# Patient Record
Sex: MALE | Race: WHITE | Employment: OTHER | URBAN - METROPOLITAN AREA
[De-identification: names, ages, dates, MRNs, and addresses within clinical notes are randomized per-mention and may not be internally consistent; named-entity substitution may affect disease eponyms.]

---

## 2017-03-27 ENCOUNTER — HOSPITAL ENCOUNTER (EMERGENCY)
Age: 77
Discharge: HOME | End: 2017-03-27
Attending: EMERGENCY MEDICINE
Payer: MEDICARE

## 2017-03-27 ENCOUNTER — APPOINTMENT (OUTPATIENT)
Dept: CT IMAGING | Age: 77
End: 2017-03-27
Attending: EMERGENCY MEDICINE
Payer: MEDICARE

## 2017-03-27 VITALS
HEIGHT: 68 IN | BODY MASS INDEX: 26.26 KG/M2 | SYSTOLIC BLOOD PRESSURE: 134 MMHG | RESPIRATION RATE: 16 BRPM | OXYGEN SATURATION: 96 % | DIASTOLIC BLOOD PRESSURE: 74 MMHG | TEMPERATURE: 98.5 F | WEIGHT: 173.25 LBS | HEART RATE: 87 BPM

## 2017-03-27 DIAGNOSIS — R10.84 ABDOMINAL PAIN, GENERALIZED: Primary | ICD-10-CM

## 2017-03-27 DIAGNOSIS — G89.18 POST-OP PAIN: ICD-10-CM

## 2017-03-27 LAB
ALBUMIN SERPL BCP-MCNC: 3.4 G/DL (ref 3.5–5)
ALBUMIN/GLOB SERPL: 0.8 {RATIO} (ref 1.1–2.2)
ALP SERPL-CCNC: 142 U/L (ref 45–117)
ALT SERPL-CCNC: 65 U/L (ref 12–78)
ANION GAP BLD CALC-SCNC: 5 MMOL/L (ref 5–15)
AST SERPL W P-5'-P-CCNC: 27 U/L (ref 15–37)
BASOPHILS # BLD AUTO: 0 K/UL (ref 0–0.1)
BASOPHILS # BLD: 0 % (ref 0–1)
BILIRUB SERPL-MCNC: 1.2 MG/DL (ref 0.2–1)
BUN SERPL-MCNC: 18 MG/DL (ref 6–20)
BUN/CREAT SERPL: 12 (ref 12–20)
CALCIUM SERPL-MCNC: 10.8 MG/DL (ref 8.5–10.1)
CHLORIDE SERPL-SCNC: 91 MMOL/L (ref 97–108)
CO2 SERPL-SCNC: 35 MMOL/L (ref 21–32)
CREAT SERPL-MCNC: 1.51 MG/DL (ref 0.7–1.3)
EOSINOPHIL # BLD: 0.1 K/UL (ref 0–0.4)
EOSINOPHIL NFR BLD: 1 % (ref 0–7)
ERYTHROCYTE [DISTWIDTH] IN BLOOD BY AUTOMATED COUNT: 12.7 % (ref 11.5–14.5)
GLOBULIN SER CALC-MCNC: 4.4 G/DL (ref 2–4)
GLUCOSE SERPL-MCNC: 113 MG/DL (ref 65–100)
HCT VFR BLD AUTO: 41.6 % (ref 36.6–50.3)
HGB BLD-MCNC: 14.7 G/DL (ref 12.1–17)
LACTATE BLD-SCNC: 0.8 MMOL/L (ref 0.4–2)
LIPASE SERPL-CCNC: 96 U/L (ref 73–393)
LYMPHOCYTES # BLD AUTO: 15 % (ref 12–49)
LYMPHOCYTES # BLD: 1.8 K/UL (ref 0.8–3.5)
MCH RBC QN AUTO: 31.5 PG (ref 26–34)
MCHC RBC AUTO-ENTMCNC: 35.3 G/DL (ref 30–36.5)
MCV RBC AUTO: 89.3 FL (ref 80–99)
MONOCYTES # BLD: 1.7 K/UL (ref 0–1)
MONOCYTES NFR BLD AUTO: 15 % (ref 5–13)
NEUTS SEG # BLD: 8.2 K/UL (ref 1.8–8)
NEUTS SEG NFR BLD AUTO: 69 % (ref 32–75)
PLATELET # BLD AUTO: 338 K/UL (ref 150–400)
POTASSIUM SERPL-SCNC: 4.1 MMOL/L (ref 3.5–5.1)
PROT SERPL-MCNC: 7.8 G/DL (ref 6.4–8.2)
RBC # BLD AUTO: 4.66 M/UL (ref 4.1–5.7)
SODIUM SERPL-SCNC: 131 MMOL/L (ref 136–145)
WBC # BLD AUTO: 11.9 K/UL (ref 4.1–11.1)

## 2017-03-27 PROCEDURE — 99283 EMERGENCY DEPT VISIT LOW MDM: CPT

## 2017-03-27 PROCEDURE — 96375 TX/PRO/DX INJ NEW DRUG ADDON: CPT

## 2017-03-27 PROCEDURE — 80053 COMPREHEN METABOLIC PANEL: CPT | Performed by: EMERGENCY MEDICINE

## 2017-03-27 PROCEDURE — 83605 ASSAY OF LACTIC ACID: CPT

## 2017-03-27 PROCEDURE — 85025 COMPLETE CBC W/AUTO DIFF WBC: CPT | Performed by: EMERGENCY MEDICINE

## 2017-03-27 PROCEDURE — 96361 HYDRATE IV INFUSION ADD-ON: CPT

## 2017-03-27 PROCEDURE — 83690 ASSAY OF LIPASE: CPT | Performed by: EMERGENCY MEDICINE

## 2017-03-27 PROCEDURE — 96374 THER/PROPH/DIAG INJ IV PUSH: CPT

## 2017-03-27 PROCEDURE — 74176 CT ABD & PELVIS W/O CONTRAST: CPT

## 2017-03-27 PROCEDURE — 74011250636 HC RX REV CODE- 250/636: Performed by: EMERGENCY MEDICINE

## 2017-03-27 PROCEDURE — 36415 COLL VENOUS BLD VENIPUNCTURE: CPT | Performed by: EMERGENCY MEDICINE

## 2017-03-27 RX ORDER — ONDANSETRON 2 MG/ML
4 INJECTION INTRAMUSCULAR; INTRAVENOUS
Status: COMPLETED | OUTPATIENT
Start: 2017-03-27 | End: 2017-03-27

## 2017-03-27 RX ORDER — OXYCODONE AND ACETAMINOPHEN 5; 325 MG/1; MG/1
1 TABLET ORAL
COMMUNITY
End: 2018-10-08

## 2017-03-27 RX ORDER — LISINOPRIL 40 MG/1
40 TABLET ORAL
COMMUNITY

## 2017-03-27 RX ORDER — MORPHINE SULFATE 4 MG/ML
4 INJECTION, SOLUTION INTRAMUSCULAR; INTRAVENOUS
Status: COMPLETED | OUTPATIENT
Start: 2017-03-27 | End: 2017-03-27

## 2017-03-27 RX ORDER — ONDANSETRON 4 MG/1
4 TABLET, ORALLY DISINTEGRATING ORAL
Qty: 8 TAB | Refills: 0 | Status: SHIPPED | OUTPATIENT
Start: 2017-03-27 | End: 2018-10-08

## 2017-03-27 RX ORDER — GUAIFENESIN 100 MG/5ML
81 LIQUID (ML) ORAL DAILY
COMMUNITY
End: 2017-11-20

## 2017-03-27 RX ADMIN — ONDANSETRON 4 MG: 2 INJECTION INTRAMUSCULAR; INTRAVENOUS at 06:36

## 2017-03-27 RX ADMIN — SODIUM CHLORIDE 1000 ML: 900 INJECTION, SOLUTION INTRAVENOUS at 06:36

## 2017-03-27 RX ADMIN — Medication 4 MG: at 06:37

## 2017-03-27 NOTE — ED TRIAGE NOTES
I had gallbladder surgery last Thurs. And I am still having a lot of pain and am really nausea. Wife states pt. Has been hallucinating.

## 2017-03-27 NOTE — DISCHARGE INSTRUCTIONS
Abdominal Pain: Care Instructions  Your Care Instructions    Abdominal pain has many possible causes. Some aren't serious and get better on their own in a few days. Others need more testing and treatment. If your pain continues or gets worse, you need to be rechecked and may need more tests to find out what is wrong. You may need surgery to correct the problem. Don't ignore new symptoms, such as fever, nausea and vomiting, urination problems, pain that gets worse, and dizziness. These may be signs of a more serious problem. Your doctor may have recommended a follow-up visit in the next 8 to 12 hours. If you are not getting better, you may need more tests or treatment. The doctor has checked you carefully, but problems can develop later. If you notice any problems or new symptoms, get medical treatment right away. Follow-up care is a key part of your treatment and safety. Be sure to make and go to all appointments, and call your doctor if you are having problems. It's also a good idea to know your test results and keep a list of the medicines you take. How can you care for yourself at home? · Rest until you feel better. · To prevent dehydration, drink plenty of fluids, enough so that your urine is light yellow or clear like water. Choose water and other caffeine-free clear liquids until you feel better. If you have kidney, heart, or liver disease and have to limit fluids, talk with your doctor before you increase the amount of fluids you drink. · If your stomach is upset, eat mild foods, such as rice, dry toast or crackers, bananas, and applesauce. Try eating several small meals instead of two or three large ones. · Wait until 48 hours after all symptoms have gone away before you have spicy foods, alcohol, and drinks that contain caffeine. · Do not eat foods that are high in fat. · Avoid anti-inflammatory medicines such as aspirin, ibuprofen (Advil, Motrin), and naproxen (Aleve).  These can cause stomach upset. Talk to your doctor if you take daily aspirin for another health problem. When should you call for help? Call 911 anytime you think you may need emergency care. For example, call if:  · You passed out (lost consciousness). · You pass maroon or very bloody stools. · You vomit blood or what looks like coffee grounds. · You have new, severe belly pain. Call your doctor now or seek immediate medical care if:  · Your pain gets worse, especially if it becomes focused in one area of your belly. · You have a new or higher fever. · Your stools are black and look like tar, or they have streaks of blood. · You have unexpected vaginal bleeding. · You have symptoms of a urinary tract infection. These may include:  ¨ Pain when you urinate. ¨ Urinating more often than usual.  ¨ Blood in your urine. · You are dizzy or lightheaded, or you feel like you may faint. Watch closely for changes in your health, and be sure to contact your doctor if:  · You are not getting better after 1 day (24 hours). Where can you learn more? Go to http://[employer].info/. Enter V307 in the search box to learn more about \"Abdominal Pain: Care Instructions. \"  Current as of: May 27, 2016  Content Version: 11.1  © 6299-3070 [employer]. Care instructions adapted under license by [employer] (which disclaims liability or warranty for this information). If you have questions about a medical condition or this instruction, always ask your healthcare professional. [place] [address] any warranty or liability for your use of this information. We hope that we have addressed all of your medical concerns. The examination and treatment you received in the Emergency Department were for an emergent problem and were not intended as complete care. It is important that you follow up with your healthcare provider(s) for ongoing care.  If your symptoms worsen or do not improve as expected, and you are unable to reach your usual health care provider(s), you should return to the Emergency Department. Today's healthcare is undergoing tremendous change, and patient satisfaction surveys are one of the many tools to assess the quality of medical care. You may receive a survey from the [employer] regarding your experience in the Emergency Department. I hope that your experience has been completely positive, particularly the medical care that I provided. As such, please participate in the survey; anything less than excellent does not meet my expectations or intentions. [address] [place] [address] and [employer] participate in nationally recognized quality of care measures. If your blood pressure is greater than 120/80, as reported below, we urge that you seek medical care to address the potential of high blood pressure, commonly known as hypertension. Hypertension can be hereditary or can be caused by certain medical conditions, pain, stress, or \"white coat syndrome. \"       Please make an appointment with your health care provider(s) for follow up of your Emergency Department visit. VITALS:   Patient Vitals for the past 8 hrs:   Temp Pulse Resp SpO2   03/27/17 0422 98.4 °F (36.9 °C) (!) 101 16 96 %          Thank you for allowing us to provide you with medical care today. We realize that you have many choices for your emergency care needs. Please choose us in the future for any continued health care needs.       Evens Wiley MD    [facility]   Office: [phone]            Recent Results (from the past 24 hour(s))   CBC WITH AUTOMATED DIFF    Collection Time: 03/27/17  5:03 AM   Result Value Ref Range    WBC 11.9 (H) 4.1 - 11.1 K/uL    RBC 4.66 4.10 - 5.70 M/uL    HGB 14.7 12.1 - 17.0 g/dL    HCT 41.6 36.6 - 50.3 %    MCV 89.3 80.0 - 99.0 FL    MCH 31.5 26.0 - 34.0 PG    MCHC 35.3 30.0 - 36.5 g/dL    RDW 12.7 11.5 - 14.5 %    PLATELET 597 567 - 134 K/uL    NEUTROPHILS 69 32 - 75 %    LYMPHOCYTES 15 12 - 49 %    MONOCYTES 15 (H) 5 - 13 %    EOSINOPHILS 1 0 - 7 %    BASOPHILS 0 0 - 1 %    ABS. NEUTROPHILS 8.2 (H) 1.8 - 8.0 K/UL    ABS. LYMPHOCYTES 1.8 0.8 - 3.5 K/UL    ABS. MONOCYTES 1.7 (H) 0.0 - 1.0 K/UL    ABS. EOSINOPHILS 0.1 0.0 - 0.4 K/UL    ABS. BASOPHILS 0.0 0.0 - 0.1 K/UL   METABOLIC PANEL, COMPREHENSIVE    Collection Time: 03/27/17  5:03 AM   Result Value Ref Range    Sodium 131 (L) 136 - 145 mmol/L    Potassium 4.1 3.5 - 5.1 mmol/L    Chloride 91 (L) 97 - 108 mmol/L    CO2 35 (H) 21 - 32 mmol/L    Anion gap 5 5 - 15 mmol/L    Glucose 113 (H) 65 - 100 mg/dL    BUN 18 6 - 20 MG/DL    Creatinine 1.51 (H) 0.70 - 1.30 MG/DL    BUN/Creatinine ratio 12 12 - 20      GFR est AA 54 (L) >60 ml/min/1.73m2    GFR est non-AA 45 (L) >60 ml/min/1.73m2    Calcium 10.8 (H) 8.5 - 10.1 MG/DL    Bilirubin, total 1.2 (H) 0.2 - 1.0 MG/DL    ALT (SGPT) 65 12 - 78 U/L    AST (SGOT) 27 15 - 37 U/L    Alk. phosphatase 142 (H) 45 - 117 U/L    Protein, total 7.8 6.4 - 8.2 g/dL    Albumin 3.4 (L) 3.5 - 5.0 g/dL    Globulin 4.4 (H) 2.0 - 4.0 g/dL    A-G Ratio 0.8 (L) 1.1 - 2.2     LIPASE    Collection Time: 03/27/17  5:03 AM   Result Value Ref Range    Lipase 96 73 - 393 U/L   POC LACTIC ACID    Collection Time: 03/27/17  5:41 AM   Result Value Ref Range    Lactic Acid (POC) 0.8 0.4 - 2.0 mmol/L       Ct Abd Pelv Wo Cont    Result Date: 3/27/2017  EXAM:  CT ABD PELV WO CONT INDICATION: Right-sided abdominal pain and nausea. Cholecystectomy on 3/23/2017 COMPARISON: None. TECHNIQUE: Helical CT of the abdomen  and pelvis  without contrast. Coronal and sagittal reformats are performed. CT dose reduction was achieved through use of a standardized protocol tailored for this examination and automatic exposure control for dose modulation. Adaptive statistical iterative reconstruction (ASIR) was utilized.  FINDINGS: Solid organ evaluation is limited without contrast. The visualized lung bases demonstrate a trace right pleural effusion and right basilar atelectasis. The heart size is normal. There is no pericardial effusion. There is no renal, ureteral, or bladder calculus. The kidneys are symmetric without hydronephrosis. There is no perinephric fluid or fat stranding. Surgical changes are seen following cholecystectomy with no significant inflammation or collection in the gallbladder fossa. There is no biliary duct dilatation. There are multiple round radiodensities in the common bile duct suggestive of stones (series 601B, image 50). The liver, spleen, pancreas, and adrenal glands are normal.  There are no dilated bowel loops. The appendix is normal.  Sigmoid diverticulosis without focal adjacent inflammation. There are no enlarged lymph nodes. There is no free fluid or free air. There is focal dilatation of the distal aorta measuring 2.6 x 2.8 cm with aortoiliac atherosclerosis. There is no pelvic mass. There are degenerative changes in the lower lumbar spine. There is no aggressive blastic or lytic bony lesion. IMPRESSION: 1. Surgical changes are seen following cholecystectomy without significant inflammation or collection in the gallbladder fossa. Multiple stones are identified in the common bile duct. There is no biliary duct dilatation. 2. Trace right pleural effusion and right basilar atelectasis. 3. Focal dilatation of the distal aorta measuring up to 2.8 cm.

## 2017-03-27 NOTE — ED PROVIDER NOTES
HPI Comments: 67yo M with pmh sig for htn who had laparoscopic cholecystectomy on 3/23 at [facility].  Pt was discharged home Friday. Pt no complains of intermittent abd pain and nuasea. No vomiting. Difficulty sleeping last night. No known fever. Pt taking percocet with some relief of symptoms. Having normal BM using stool softener. Pt not on blood thinners. Pt called his surgeon who was at [facility] but patient and his wife did not want to go to [facility]. Patient is a 76 y.o. male presenting with abdominal pain. The history is provided by the patient and the spouse. Abdominal Pain    Pertinent negatives include no fever, no dysuria, no headaches and no chest pain. Past Medical History:   Diagnosis Date    GERD (gastroesophageal reflux disease)     Hypertension        Past Surgical History:   Procedure Laterality Date    HX CHOLECYSTECTOMY      HX COLONOSCOPY  5/2013    with polyps         History reviewed. No pertinent family history. Social History     Social History    Marital status:      Spouse name: N/A    Number of children: N/A    Years of education: N/A     Occupational History    Not on file. Social History Main Topics    Smoking status: Former Smoker     Packs/day: 0.25     Years: 11.00     Quit date: 1/1/1966    Smokeless tobacco: Not on file    Alcohol use Yes      Comment: occasional    Drug use: Not on file    Sexual activity: Not on file     Other Topics Concern    Not on file     Social History Narrative         ALLERGIES: Review of patient's allergies indicates no known allergies. Review of Systems   Constitutional: Negative for diaphoresis and fever. HENT: Negative for facial swelling. Eyes: Negative for visual disturbance. Respiratory: Negative for cough. Cardiovascular: Negative for chest pain. Gastrointestinal: Positive for abdominal pain. Genitourinary: Negative for dysuria. Musculoskeletal: Negative for joint swelling. Skin: Negative for rash. Neurological: Negative for headaches. Hematological: Negative for adenopathy. Psychiatric/Behavioral: Negative for suicidal ideas. Vitals:    03/27/17 0422   Pulse: (!) 101   Resp: 16   Temp: 98.4 °F (36.9 °C)   SpO2: 96%   Weight: 78.6 kg (173 lb 4 oz)   Height: 5' 7.5\" (1.715 m)            Physical Exam   Constitutional: He is oriented to person, place, and time. He appears well-developed and well-nourished. No distress. HENT:   Head: Normocephalic and atraumatic. Mouth/Throat: Oropharynx is clear and moist.   Eyes: Pupils are equal, round, and reactive to light. Neck: Normal range of motion. Neck supple. Cardiovascular: Normal rate, regular rhythm, normal heart sounds and intact distal pulses. Pulmonary/Chest: Effort normal and breath sounds normal. No respiratory distress. Abdominal: Soft. Bowel sounds are normal. He exhibits no distension. There is no tenderness. Healing laparoscopic abd incisions with mild ecchymosis. No focal tenderness or drainage. Musculoskeletal: Normal range of motion. He exhibits no edema. Neurological: He is alert and oriented to person, place, and time. Skin: Skin is warm and dry. Nursing note and vitals reviewed. MDM  Number of Diagnoses or Management Options  Diagnosis management comments: A:  abd pain and nuasea following laparoscopic cholecystectomy. VS stable. Exam unremarkable. P:  Labs  ivf  Morphine/zofran  Ct a/p    ED Course       Procedures    WBC=11.9  Na and Cl slightly low. BUN/Cr=18/1.51  Lipase normal    CT scan shows stones in CBD but no dilatation. 6:56 AM  Patient resting comfortably with no complaints at this time. VS remain stable. Repeat physical exam is unremarkable. Pt ambulatory without difficulty and tolerating po's well. No evidence of obstruction. Pt stable for discharge to f/u with his surgeon.

## 2017-03-29 NOTE — CALL BACK NOTE
[facility] Emergency Department Follow Up Call Record    Discharged to : Home/Family Home/Home Health/Skilled Facility/Rehab/Assisted Living/Other__home_____  1) Did you receive your discharge instructions? Yes        2) Do you understand them? Yes    This writer spoke with Vinay Resendez who reports he \"thinks he is doing better today\" with eating and activities. Less abdominal pain. He reports having felt sick last few days. He has not had to use the zofran today. Son has offered to take hs father, Mr Camelia Craven to [address] [place] [address] for follow up appointment post cholecystectomy. 3) Are you able to follow them? Yes          If NO, what can I clarify for you? Need for follow up. Unclear as to PCP follow up. Will send list of [facility] [employer]. 4) Do you understand your diagnosis? Yes         5) Do you know which symptoms should prompt you to call the doctor? Yes     6) Were you able to fill and  any medications that were prescribed? Yes     7) You were prescribed __zofran_________for __nausea__________________. Common side effects of this medication are___rash_________________. This is not a complete list so please review the forms given from the pharmacy for a complete list.      8) Are there any questions about your medications? No            Have you scheduled any recommended doctors appointments (specialty, PCP) Mr. Vinay Resendez reports he may be following up (post surgical follow up) with surgeon. If NO, what barriers are you encountering (transportation/lost contact info/cost/  didnt think necessary/no PCP  9) If discharged with Home Health, has the agency contacted you to schedule visit? Not applicable  10) Is there anyone available to help you at home (meals, errands, transportation    monitoring) (adult children, neighbors, private duty companions) Yes SON   11) Are you on a special diet? No         If YES, do you understand the requirements for this diet? Education provided?   12) If presented with cough, bronchitis, COPD, asthma, is it ok to ask that the   respiratory disease management educator call you? Not applicable      13)  A) If presented with fall, were you issued an assistive device in the ED    Are you using? Not applicable  B) If given RX for device, have you obtained? Not applicable       If NO, barriers? C) Therapist recommended: NO   Are you able to implement the suggestions? Not applicable        If NO, barriers to implementation? D) Are you having any difficulties with mobility inside your home?     (steps, bed, tub)No   If YES, ask if the [facility] PT can contact patient and good time and number?  14)  At the end of your discharge instructions, there is information about accessing [facility], have you had a chance to review those? No         Do you have any questions about signing up for this service? We encourage our patients to be active participants in their healthcare and this site is one of the ways to do that. It will allow you to access parts of your medical record, email your doctors office, schedule appointments, and request medications refills . 15) Are there any other questions that I can answer for you regarding    your Emergency department visit?  NO             Estimated Call Time:_____12:55 PM  ______________ Date/Time:_______________

## 2017-11-20 ENCOUNTER — ANESTHESIA (OUTPATIENT)
Dept: ENDOSCOPY | Age: 77
End: 2017-11-20
Payer: MEDICARE

## 2017-11-20 ENCOUNTER — HOSPITAL ENCOUNTER (OUTPATIENT)
Age: 77
Setting detail: OUTPATIENT SURGERY
Discharge: HOME | End: 2017-11-20
Attending: SPECIALIST | Admitting: SPECIALIST
Payer: MEDICARE

## 2017-11-20 ENCOUNTER — ANESTHESIA EVENT (OUTPATIENT)
Dept: ENDOSCOPY | Age: 77
End: 2017-11-20
Payer: MEDICARE

## 2017-11-20 VITALS
BODY MASS INDEX: 26.22 KG/M2 | RESPIRATION RATE: 22 BRPM | SYSTOLIC BLOOD PRESSURE: 125 MMHG | OXYGEN SATURATION: 99 % | TEMPERATURE: 97.8 F | WEIGHT: 173 LBS | DIASTOLIC BLOOD PRESSURE: 88 MMHG | HEART RATE: 100 BPM | HEIGHT: 68 IN

## 2017-11-20 PROCEDURE — 77030003657 HC NDL SCLER BSC -B: Performed by: SPECIALIST

## 2017-11-20 PROCEDURE — 77030009426 HC FCPS BIOP ENDOSC BSC -B: Performed by: SPECIALIST

## 2017-11-20 PROCEDURE — 77030011640 HC PAD GRND REM COVD -A: Performed by: SPECIALIST

## 2017-11-20 PROCEDURE — 77030027957 HC TBNG IRR ENDOGTR BUSS -B: Performed by: SPECIALIST

## 2017-11-20 PROCEDURE — 74011250637 HC RX REV CODE- 250/637: Performed by: SPECIALIST

## 2017-11-20 PROCEDURE — 77030013992 HC SNR POLYP ENDOSC BSC -B: Performed by: SPECIALIST

## 2017-11-20 PROCEDURE — 88305 TISSUE EXAM BY PATHOLOGIST: CPT | Performed by: SPECIALIST

## 2017-11-20 PROCEDURE — 74011000250 HC RX REV CODE- 250

## 2017-11-20 PROCEDURE — 76040000007: Performed by: SPECIALIST

## 2017-11-20 PROCEDURE — 76060000032 HC ANESTHESIA 0.5 TO 1 HR: Performed by: SPECIALIST

## 2017-11-20 PROCEDURE — 74011250636 HC RX REV CODE- 250/636

## 2017-11-20 RX ORDER — EPINEPHRINE 0.1 MG/ML
1 INJECTION INTRACARDIAC; INTRAVENOUS
Status: DISCONTINUED | OUTPATIENT
Start: 2017-11-20 | End: 2017-11-20 | Stop reason: HOSPADM

## 2017-11-20 RX ORDER — PROPOFOL 10 MG/ML
INJECTION, EMULSION INTRAVENOUS AS NEEDED
Status: DISCONTINUED | OUTPATIENT
Start: 2017-11-20 | End: 2017-11-20 | Stop reason: HOSPADM

## 2017-11-20 RX ORDER — NALOXONE HYDROCHLORIDE 0.4 MG/ML
0.4 INJECTION, SOLUTION INTRAMUSCULAR; INTRAVENOUS; SUBCUTANEOUS
Status: DISCONTINUED | OUTPATIENT
Start: 2017-11-20 | End: 2017-11-20 | Stop reason: HOSPADM

## 2017-11-20 RX ORDER — LIDOCAINE HYDROCHLORIDE 20 MG/ML
INJECTION, SOLUTION EPIDURAL; INFILTRATION; INTRACAUDAL; PERINEURAL AS NEEDED
Status: DISCONTINUED | OUTPATIENT
Start: 2017-11-20 | End: 2017-11-20 | Stop reason: HOSPADM

## 2017-11-20 RX ORDER — SODIUM CHLORIDE 9 MG/ML
INJECTION, SOLUTION INTRAVENOUS
Status: DISCONTINUED | OUTPATIENT
Start: 2017-11-20 | End: 2017-11-20 | Stop reason: HOSPADM

## 2017-11-20 RX ORDER — FLUMAZENIL 0.1 MG/ML
0.2 INJECTION INTRAVENOUS
Status: DISCONTINUED | OUTPATIENT
Start: 2017-11-20 | End: 2017-11-20 | Stop reason: HOSPADM

## 2017-11-20 RX ORDER — DEXTROMETHORPHAN/PSEUDOEPHED 2.5-7.5/.8
1.2 DROPS ORAL
Status: DISCONTINUED | OUTPATIENT
Start: 2017-11-20 | End: 2017-11-20 | Stop reason: HOSPADM

## 2017-11-20 RX ORDER — FENTANYL CITRATE 50 UG/ML
200 INJECTION, SOLUTION INTRAMUSCULAR; INTRAVENOUS
Status: DISCONTINUED | OUTPATIENT
Start: 2017-11-20 | End: 2017-11-20 | Stop reason: HOSPADM

## 2017-11-20 RX ORDER — LORAZEPAM 2 MG/ML
2 INJECTION INTRAMUSCULAR AS NEEDED
Status: DISCONTINUED | OUTPATIENT
Start: 2017-11-20 | End: 2017-11-20 | Stop reason: HOSPADM

## 2017-11-20 RX ORDER — SODIUM CHLORIDE 0.9 % (FLUSH) 0.9 %
5-10 SYRINGE (ML) INJECTION EVERY 8 HOURS
Status: DISCONTINUED | OUTPATIENT
Start: 2017-11-20 | End: 2017-11-20 | Stop reason: HOSPADM

## 2017-11-20 RX ORDER — ATROPINE SULFATE 0.1 MG/ML
0.5 INJECTION INTRAVENOUS
Status: DISCONTINUED | OUTPATIENT
Start: 2017-11-20 | End: 2017-11-20 | Stop reason: HOSPADM

## 2017-11-20 RX ORDER — SODIUM CHLORIDE 0.9 % (FLUSH) 0.9 %
5-10 SYRINGE (ML) INJECTION AS NEEDED
Status: DISCONTINUED | OUTPATIENT
Start: 2017-11-20 | End: 2017-11-20 | Stop reason: HOSPADM

## 2017-11-20 RX ORDER — OMEPRAZOLE 10 MG/1
10 CAPSULE, DELAYED RELEASE ORAL
COMMUNITY

## 2017-11-20 RX ORDER — SODIUM CHLORIDE 9 MG/ML
50 INJECTION, SOLUTION INTRAVENOUS CONTINUOUS
Status: DISCONTINUED | OUTPATIENT
Start: 2017-11-20 | End: 2017-11-20 | Stop reason: HOSPADM

## 2017-11-20 RX ORDER — MIDAZOLAM HYDROCHLORIDE 1 MG/ML
.25-1 INJECTION, SOLUTION INTRAMUSCULAR; INTRAVENOUS
Status: DISCONTINUED | OUTPATIENT
Start: 2017-11-20 | End: 2017-11-20 | Stop reason: HOSPADM

## 2017-11-20 RX ADMIN — SODIUM CHLORIDE: 9 INJECTION, SOLUTION INTRAVENOUS at 12:00

## 2017-11-20 RX ADMIN — PROPOFOL 50 MG: 10 INJECTION, EMULSION INTRAVENOUS at 12:24

## 2017-11-20 RX ADMIN — PROPOFOL 50 MG: 10 INJECTION, EMULSION INTRAVENOUS at 12:10

## 2017-11-20 RX ADMIN — LIDOCAINE HYDROCHLORIDE 40 MG: 20 INJECTION, SOLUTION EPIDURAL; INFILTRATION; INTRACAUDAL; PERINEURAL at 12:09

## 2017-11-20 RX ADMIN — PROPOFOL 30 MG: 10 INJECTION, EMULSION INTRAVENOUS at 12:32

## 2017-11-20 RX ADMIN — PROPOFOL 50 MG: 10 INJECTION, EMULSION INTRAVENOUS at 12:17

## 2017-11-20 RX ADMIN — PROPOFOL 50 MG: 10 INJECTION, EMULSION INTRAVENOUS at 12:12

## 2017-11-20 RX ADMIN — PROPOFOL 50 MG: 10 INJECTION, EMULSION INTRAVENOUS at 12:09

## 2017-11-20 NOTE — H&P
Pre-endoscopy H and P for Colonoscopy    The patient was seen and examined. Date of last colonoscopy: 2012, Polyps  No      The airway was assessed and documented. The problem list, past medical history, and medications were reviewed. There is no problem list on file for this patient. Social History     Social History    Marital status:      Spouse name: N/A    Number of children: N/A    Years of education: N/A     Occupational History    Not on file. Social History Main Topics    Smoking status: Former Smoker     Packs/day: 0.25     Years: 11.00     Quit date: 1/1/1966    Smokeless tobacco: Not on file    Alcohol use Yes      Comment: occasional    Drug use: Not on file    Sexual activity: Not on file     Other Topics Concern    Not on file     Social History Narrative     Past Medical History:   Diagnosis Date    GERD (gastroesophageal reflux disease)     Hypertension      The patient has a family history of na    Prior to Admission Medications   Prescriptions Last Dose Informant Patient Reported? Taking?   aspirin 81 mg chewable tablet 11/13/2017 at Unknown time  Yes Yes   Sig: Take 81 mg by mouth daily. lisinopril (PRINIVIL, ZESTRIL) 40 mg tablet 11/19/2017 at Unknown time  Yes Yes   Sig: Take 40 mg by mouth daily. omeprazole (PRILOSEC) 10 mg capsule 11/13/2017 at Unknown time  Yes Yes   Sig: Take 10 mg by mouth daily. Indications: PREVENTION OF NSAID-INDUCED GASTRIC ULCER   ondansetron (ZOFRAN ODT) 4 mg disintegrating tablet   No No   Sig: Take 1 Tab by mouth every eight (8) hours as needed for Nausea. oxyCODONE-acetaminophen (PERCOCET) 5-325 mg per tablet 10/20/2017 at Unknown time  Yes Yes   Sig: Take 1 Tab by mouth every four (4) hours as needed for Pain.       Facility-Administered Medications: None         The review of systems is:  negative for shortness of breath or chest pain      The heart, lungs and mental status were satisfactory for the administration of MAC sedation and for the procedure. Mallampati score: See Anesthesia. I discussed with the patient the objectives, risks, consequences and alternatives to the procedure. Plan: Endoscopic procedure with MAC sedation.     Hemant Cardoza MD  11/20/2017  11:58 AM

## 2017-11-20 NOTE — PROCEDURES
Colonoscopy Procedure Note    Indications:   Personal history of colon polyps (screening only)  Referring Physician: Allan Adame MD   Anesthesia/Sedation:MAC  Endoscopist:  Dr. Addie Keene  Assistant:  Endoscopy Technician-1: Artem Palomino  Endoscopy RN-1: Yung Claire    Preoperative diagnosis: SCREENING COLONOSCOPY    Postoperative diagnosis: 1. Moderate Sigmoid Diverticulosis  2. Cecum Polyp  3. Ascending Colon Polyp      Procedure in Detail:  Informed consent was obtained for the procedure, including sedation. Risks of perforation, hemorrhage, adverse drug reaction, and aspiration were discussed. The patient was placed in the left lateral decubitus position. Based on the pre-procedure assessment, including review of the patient's medical history, medications, allergies, and review of systems, he had been deemed to be an appropriate candidate for moderate sedation; he was therefore sedated with the medications listed above. The patient was monitored continuously with ECG tracing, pulse oximetry, blood pressure monitoring, and direct observations. A rectal examination was performed. The [id number] was inserted into the rectum and advanced under direct vision to the cecum, which was identified by the ileocecal valve and appendiceal orifice. The quality of the colonic preparation was excellent. A careful inspection was made as the colonoscope was withdrawn, including a retroflexed view of the rectum; findings and interventions are described below. Findings:   Rectum: Grade 1 internal hemorrhoid(s); Sigmoid: moderate diverticulosis; Descending Colon: normal  Transverse Colon: normal  Ascending Colon: very proximal 5 mm polyp removed with hot snare after saline pillow,   Cecum: 3 mm polyp removed with cold snare  Terminal Ileum: not intubated    Specimens:     see above    EBL: None    Complications: None; patient tolerated the procedure well.       Recommendations:     - If adenoma is present, repeat colonoscopy in 5 years.      - If < 10 years, reason: above average risk patient    Signed By: Estevan Gamble MD                        November 20, 2017

## 2017-11-20 NOTE — PERIOP NOTES

## 2017-11-20 NOTE — ANESTHESIA PREPROCEDURE EVALUATION
Anesthetic History               Review of Systems / Medical History  Patient summary reviewed, nursing notes reviewed and pertinent labs reviewed    Pulmonary                   Neuro/Psych              Cardiovascular    Hypertension              Exercise tolerance: >4 METS     GI/Hepatic/Renal     GERD          Comments: gastritis Endo/Other             Other Findings            Physical Exam    Airway  Mallampati: II  TM Distance: > 6 cm  Neck ROM: normal range of motion   Mouth opening: Normal     Cardiovascular    Rhythm: regular  Rate: normal         Dental  No notable dental hx       Pulmonary  Breath sounds clear to auscultation               Abdominal         Other Findings            Anesthetic Plan    ASA: 2  Anesthesia type: MAC          Induction: Intravenous  Anesthetic plan and risks discussed with: Patient

## 2017-11-20 NOTE — IP AVS SNAPSHOT
[address] 
[phone] Patient: Jaylen Esposito MRN: [id number] DP About your hospitalization You were admitted on:  2017 You last received care in the:  [facility] ENDOSCOPY You were discharged on:  2017 Why you were hospitalized Your primary diagnosis was:  Not on File Discharge Orders None A check regina indicates which time of day the medication should be taken. My Medications STOP taking these medications   
 aspirin 81 mg chewable tablet TAKE these medications as instructed Instructions Each Dose to Equal  
 Morning Noon Evening Bedtime  
 lisinopril 40 mg tablet Commonly known as:  Rdaha Needy Your last dose was: Your next dose is: Take 40 mg by mouth daily. 40 mg  
    
   
   
   
  
 ondansetron 4 mg disintegrating tablet Commonly known as:  ZOFRAN ODT Your last dose was: Your next dose is: Take 1 Tab by mouth every eight (8) hours as needed for Nausea. 4 mg PERCOCET 5-325 mg per tablet Generic drug:  oxyCODONE-acetaminophen Your last dose was: Your next dose is: Take 1 Tab by mouth every four (4) hours as needed for Pain. 1 Tab PriLOSEC 10 mg capsule Generic drug:  omeprazole Your last dose was: Your next dose is: Take 10 mg by mouth daily. Indications: PREVENTION OF NSAID-INDUCED GASTRIC ULCER  
 10 mg Discharge Instructions Jaylen Esposito 
[id number] 
1940 COLON DISCHARGE INSTRUCTIONS Discomfort: 
Redness at IV site- apply warm compress to area; if redness or soreness persist- contact your physician There may be a slight amount of blood passed from the rectum Gaseous discomfort- walking, belching will help relieve any discomfort You may not operate a vehicle for 12 hours You may not engage in an occupation involving machinery or appliances for rest of today You may not drink alcoholic beverages for at least 12 hours Avoid making any critical decisions for at least 24 hour DIET: 
 Regular diet.  however -  remember your colon is empty and a heavy meal will produce gas. Avoid these foods:  vegetables, fried / greasy foods, carbonated drinks for today. ACTIVITY: 
You may resume your normal daily activities it is recommended that you spend the remainder of the day resting -  avoid any strenuous activity. CALL M.D. ANY SIGN OF: Increasing pain, nausea, vomiting Abdominal distension (swelling) New increased bleeding (oral or rectal) Fever (chills) Pain in chest area Shortness of breath You may not  take any Advil, Aspirin, Ibuprofen, Motrin, Aleve, Goodys, or any similar pain or arthritis products for 10 days, ONLY  Tylenol as needed for pain. Follow-up Instructions: 
 Call Dr. Marilia Malloy Results of procedure / biopsy in 10-14days Office telephone for problems or questions [phone] Recommendation for next colonscopy in 5 years If < 10 years, reason:  above average risk patient Introducing [facility]! Dear Óscar Found: Thank you for requesting a [employer] account. Our records indicate that you already have an active IMANIN account. You can access your account anytime at [email or web address] Did you know that you can access your hospital and ER discharge instructions at any time in [employer]? You can also review all of your test results from your hospital stay or ER visit. Additional Information If you have questions, please visit the Frequently Asked Questions section of the [employer] website at [email or web address] Remember, MyChart is NOT to be used for urgent needs. For medical emergencies, dial 911. Now available from your iPhone and Android! Providers Seen During Your Hospitalization Provider Specialty Primary office phone Valentino Anaya MD Gastroenterology [phone] Your Primary Care Physician (PCP) Primary Care Physician Office Phone Office Fax Quentin Dasilva [phone] You are allergic to the following No active allergies Recent Documentation Height Weight BMI Smoking Status 1.715 m 78.5 kg 26.7 kg/m2 Former Smoker Emergency Contacts Name Discharge Info Relation Home Work Mobile Piper Osman  Spouse [[phone] Patient Belongings The following personal items are in your possession at time of discharge: 
  Dental Appliances: None  Visual Aid: None Please provide this summary of care documentation to your next provider. Signatures-by signing, you are acknowledging that this After Visit Summary has been reviewed with you and you have received a copy. Patient Signature:  ____________________________________________________________ Date:  ____________________________________________________________  
  
[employer] Provider Signature:  ____________________________________________________________ Date:  ____________________________________________________________

## 2017-11-20 NOTE — ANESTHESIA POSTPROCEDURE EVALUATION
Post-Anesthesia Evaluation and Assessment    Patient: Juan Pablo Fuentes MRN: [id number]  SSN: [id number]    YOB: 1940  Age: 76 y.o. Sex: male       Cardiovascular Function/Vital Signs  Visit Vitals    /72    Pulse 100    Temp 36.6 °C (97.8 °F)    Resp 16    Ht 5' 7.5\" (1.715 m)    Wt 78.5 kg (173 lb)    SpO2 100%    BMI 26.7 kg/m2       Patient is status post MAC anesthesia for Procedure(s):  COLONOSCOPY  ENDOSCOPIC POLYPECTOMY  INJECTION. Nausea/Vomiting: None    Postoperative hydration reviewed and adequate. Pain:  Pain Scale 1: Numeric (0 - 10) (11/20/17 1247)  Pain Intensity 1: 0 (11/20/17 1247)   Managed    Neurological Status: At baseline    Mental Status and Level of Consciousness: Arousable    Pulmonary Status:   O2 Device: Room air (11/20/17 1247)   Adequate oxygenation and airway patent    Complications related to anesthesia: None    Post-anesthesia assessment completed.  No concerns    Signed By: Eyal Ortiz MD     November 20, 2017

## 2017-11-20 NOTE — DISCHARGE INSTRUCTIONS
Abel Randall  [id number]  1940    COLON DISCHARGE INSTRUCTIONS  Discomfort:  Redness at IV site- apply warm compress to area; if redness or soreness persist- contact your physician  There may be a slight amount of blood passed from the rectum  Gaseous discomfort- walking, belching will help relieve any discomfort  You may not operate a vehicle for 12 hours  You may not engage in an occupation involving machinery or appliances for rest of today  You may not drink alcoholic beverages for at least 12 hours  Avoid making any critical decisions for at least 24 hour  DIET:   Regular diet. - however -  remember your colon is empty and a heavy meal will produce gas. Avoid these foods:  vegetables, fried / greasy foods, carbonated drinks for today. ACTIVITY:  You may resume your normal daily activities it is recommended that you spend the remainder of the day resting -  avoid any strenuous activity. CALL M.D. ANY SIGN OF:  Increasing pain, nausea, vomiting  Abdominal distension (swelling)  New increased bleeding (oral or rectal)  Fever (chills)  Pain in chest area    Shortness of breath    You may not  take any Advil, Aspirin, Ibuprofen, Motrin, Aleve, Goodys, or any similar pain or arthritis products for 10 days, ONLY  Tylenol as needed for pain.       Follow-up Instructions:   Call Dr. Keyshawn Rogel  Results of procedure / biopsy in 10-14days   Office telephone for problems or questions [phone]    Recommendation for next colonscopy in 5 years  If < 10 years, reason:  above average risk patient

## 2017-11-20 NOTE — ROUTINE PROCESS
William Hash  1940  [id number]    Situation:  Verbal report received from: Loy Zhou RN  Procedure: Procedure(s):  COLONOSCOPY  ENDOSCOPIC POLYPECTOMY  INJECTION    Background:    Preoperative diagnosis: SCREENING COLONOSCOPY  Postoperative diagnosis: 1. Moderate Sigmoid Diverticulosis  2. Cecum Polyp  3. Ascending Colon Polyp    :  Dr. Nicole Harris  Assistant(s): Endoscopy Technician-1: Yuki Mayen  Endoscopy RN-1: Solitario Hanna    Specimens:   ID Type Source Tests Collected by Time Destination   1 : cecum polyp Preservative Cecum  Michael Sanchez MD 11/20/2017 1222 Pathology   2 : Ascending colon Polyp Preservative Colon, Ascending  Michael Snachez MD 11/20/2017 1228 Pathology     H. Pylori  no    Assessment:  Intra-procedure medications   Anesthesia gave intra-procedure sedation and medications, see anesthesia flow sheet yes    Intravenous fluids: NS@ KVO     Vital signs stable     Abdominal assessment: round and soft     Recommendation:  Discharge patient per MD order.     Family or Friend   Permission to share finding with family or friend yes

## 2018-10-08 ENCOUNTER — APPOINTMENT (OUTPATIENT)
Dept: CT IMAGING | Age: 78
DRG: 896 | End: 2018-10-08
Attending: EMERGENCY MEDICINE
Payer: MEDICARE

## 2018-10-08 ENCOUNTER — APPOINTMENT (OUTPATIENT)
Dept: GENERAL RADIOLOGY | Age: 78
DRG: 896 | End: 2018-10-08
Attending: FAMILY MEDICINE
Payer: MEDICARE

## 2018-10-08 ENCOUNTER — HOSPITAL ENCOUNTER (INPATIENT)
Age: 78
LOS: 3 days | Discharge: HOME | DRG: 896 | End: 2018-10-11
Attending: EMERGENCY MEDICINE | Admitting: FAMILY MEDICINE
Payer: MEDICARE

## 2018-10-08 DIAGNOSIS — R56.9 SEIZURE (HCC): ICD-10-CM

## 2018-10-08 DIAGNOSIS — F10.939 ALCOHOL WITHDRAWAL SYNDROME WITH COMPLICATION (HCC): ICD-10-CM

## 2018-10-08 DIAGNOSIS — R10.13 ABDOMINAL PAIN, EPIGASTRIC: Primary | ICD-10-CM

## 2018-10-08 PROBLEM — R79.89 ELEVATED LFTS: Status: ACTIVE | Noted: 2018-10-08

## 2018-10-08 PROBLEM — R41.82 ALTERED MENTAL STATUS: Status: ACTIVE | Noted: 2018-10-08

## 2018-10-08 PROBLEM — I63.9 STROKE (HCC): Status: ACTIVE | Noted: 2018-10-08

## 2018-10-08 LAB
ALBUMIN SERPL-MCNC: 3.7 G/DL (ref 3.5–5)
ALBUMIN/GLOB SERPL: 0.9 {RATIO} (ref 1.1–2.2)
ALP SERPL-CCNC: 301 U/L (ref 45–117)
ALT SERPL-CCNC: 442 U/L (ref 12–78)
AMMONIA PLAS-SCNC: 16 UMOL/L
ANION GAP SERPL CALC-SCNC: 6 MMOL/L (ref 5–15)
APTT PPP: 25.1 SEC (ref 22.1–32)
AST SERPL-CCNC: 770 U/L (ref 15–37)
BASOPHILS # BLD: 0.1 K/UL (ref 0–0.1)
BASOPHILS NFR BLD: 1 % (ref 0–1)
BILIRUB SERPL-MCNC: 1.6 MG/DL (ref 0.2–1)
BUN SERPL-MCNC: 16 MG/DL (ref 6–20)
BUN/CREAT SERPL: 12 (ref 12–20)
CALCIUM SERPL-MCNC: 8.8 MG/DL (ref 8.5–10.1)
CHLORIDE SERPL-SCNC: 104 MMOL/L (ref 97–108)
CO2 SERPL-SCNC: 26 MMOL/L (ref 21–32)
COMMENT, HOLDF: NORMAL
COMMENT, HOLDF: NORMAL
CREAT SERPL-MCNC: 1.32 MG/DL (ref 0.7–1.3)
DIFFERENTIAL METHOD BLD: NORMAL
EOSINOPHIL # BLD: 0.1 K/UL (ref 0–0.4)
EOSINOPHIL NFR BLD: 1 % (ref 0–7)
ERYTHROCYTE [DISTWIDTH] IN BLOOD BY AUTOMATED COUNT: 12.8 % (ref 11.5–14.5)
ETHANOL SERPL-MCNC: <10 MG/DL
GLOBULIN SER CALC-MCNC: 4 G/DL (ref 2–4)
GLUCOSE SERPL-MCNC: 104 MG/DL (ref 65–100)
HCT VFR BLD AUTO: 42.4 % (ref 36.6–50.3)
HGB BLD-MCNC: 14.8 G/DL (ref 12.1–17)
IMM GRANULOCYTES # BLD: 0 K/UL (ref 0–0.04)
IMM GRANULOCYTES NFR BLD AUTO: 0 % (ref 0–0.5)
INR PPP: 1 (ref 0.9–1.1)
LACTATE SERPL-SCNC: 1.3 MMOL/L (ref 0.4–2)
LIPASE SERPL-CCNC: 253 U/L (ref 73–393)
LYMPHOCYTES # BLD: 1.8 K/UL (ref 0.8–3.5)
LYMPHOCYTES NFR BLD: 20 % (ref 12–49)
MCH RBC QN AUTO: 32.5 PG (ref 26–34)
MCHC RBC AUTO-ENTMCNC: 34.9 G/DL (ref 30–36.5)
MCV RBC AUTO: 93 FL (ref 80–99)
MONOCYTES # BLD: 0.7 K/UL (ref 0–1)
MONOCYTES NFR BLD: 8 % (ref 5–13)
NEUTS SEG # BLD: 6.4 K/UL (ref 1.8–8)
NEUTS SEG NFR BLD: 71 % (ref 32–75)
NRBC # BLD: 0 K/UL (ref 0–0.01)
NRBC BLD-RTO: 0 PER 100 WBC
PLATELET # BLD AUTO: 339 K/UL (ref 150–400)
PMV BLD AUTO: 9.3 FL (ref 8.9–12.9)
POTASSIUM SERPL-SCNC: 4 MMOL/L (ref 3.5–5.1)
PROT SERPL-MCNC: 7.7 G/DL (ref 6.4–8.2)
PROTHROMBIN TIME: 9.8 SEC (ref 9–11.1)
RBC # BLD AUTO: 4.56 M/UL (ref 4.1–5.7)
SAMPLES BEING HELD,HOLD: NORMAL
SAMPLES BEING HELD,HOLD: NORMAL
SODIUM SERPL-SCNC: 136 MMOL/L (ref 136–145)
THERAPEUTIC RANGE,PTTT: NORMAL SECS (ref 58–77)
TROPONIN I SERPL-MCNC: <0.05 NG/ML
WBC # BLD AUTO: 9 K/UL (ref 4.1–11.1)

## 2018-10-08 PROCEDURE — 74011000258 HC RX REV CODE- 258: Performed by: FAMILY MEDICINE

## 2018-10-08 PROCEDURE — 74011000250 HC RX REV CODE- 250: Performed by: EMERGENCY MEDICINE

## 2018-10-08 PROCEDURE — 85025 COMPLETE CBC W/AUTO DIFF WBC: CPT | Performed by: EMERGENCY MEDICINE

## 2018-10-08 PROCEDURE — 95816 EEG AWAKE AND DROWSY: CPT | Performed by: FAMILY MEDICINE

## 2018-10-08 PROCEDURE — 74174 CTA ABD&PLVS W/CONTRAST: CPT

## 2018-10-08 PROCEDURE — 74011636320 HC RX REV CODE- 636/320: Performed by: EMERGENCY MEDICINE

## 2018-10-08 PROCEDURE — 83605 ASSAY OF LACTIC ACID: CPT | Performed by: FAMILY MEDICINE

## 2018-10-08 PROCEDURE — 96376 TX/PRO/DX INJ SAME DRUG ADON: CPT

## 2018-10-08 PROCEDURE — 80053 COMPREHEN METABOLIC PANEL: CPT | Performed by: EMERGENCY MEDICINE

## 2018-10-08 PROCEDURE — 80307 DRUG TEST PRSMV CHEM ANLYZR: CPT | Performed by: FAMILY MEDICINE

## 2018-10-08 PROCEDURE — 74011250636 HC RX REV CODE- 250/636

## 2018-10-08 PROCEDURE — 93005 ELECTROCARDIOGRAM TRACING: CPT

## 2018-10-08 PROCEDURE — 74011250636 HC RX REV CODE- 250/636: Performed by: FAMILY MEDICINE

## 2018-10-08 PROCEDURE — 71045 X-RAY EXAM CHEST 1 VIEW: CPT

## 2018-10-08 PROCEDURE — 65610000006 HC RM INTENSIVE CARE

## 2018-10-08 PROCEDURE — 85610 PROTHROMBIN TIME: CPT | Performed by: EMERGENCY MEDICINE

## 2018-10-08 PROCEDURE — 83690 ASSAY OF LIPASE: CPT | Performed by: EMERGENCY MEDICINE

## 2018-10-08 PROCEDURE — 74011000258 HC RX REV CODE- 258: Performed by: EMERGENCY MEDICINE

## 2018-10-08 PROCEDURE — 70450 CT HEAD/BRAIN W/O DYE: CPT

## 2018-10-08 PROCEDURE — 99285 EMERGENCY DEPT VISIT HI MDM: CPT

## 2018-10-08 PROCEDURE — 96375 TX/PRO/DX INJ NEW DRUG ADDON: CPT

## 2018-10-08 PROCEDURE — 36415 COLL VENOUS BLD VENIPUNCTURE: CPT | Performed by: EMERGENCY MEDICINE

## 2018-10-08 PROCEDURE — 82140 ASSAY OF AMMONIA: CPT | Performed by: EMERGENCY MEDICINE

## 2018-10-08 PROCEDURE — 96374 THER/PROPH/DIAG INJ IV PUSH: CPT

## 2018-10-08 PROCEDURE — 96361 HYDRATE IV INFUSION ADD-ON: CPT

## 2018-10-08 PROCEDURE — C9113 INJ PANTOPRAZOLE SODIUM, VIA: HCPCS | Performed by: EMERGENCY MEDICINE

## 2018-10-08 PROCEDURE — 74011250636 HC RX REV CODE- 250/636: Performed by: EMERGENCY MEDICINE

## 2018-10-08 PROCEDURE — 87040 BLOOD CULTURE FOR BACTERIA: CPT | Performed by: FAMILY MEDICINE

## 2018-10-08 PROCEDURE — 85730 THROMBOPLASTIN TIME PARTIAL: CPT | Performed by: EMERGENCY MEDICINE

## 2018-10-08 PROCEDURE — 84484 ASSAY OF TROPONIN QUANT: CPT | Performed by: EMERGENCY MEDICINE

## 2018-10-08 RX ORDER — LORAZEPAM 2 MG/ML
INJECTION INTRAMUSCULAR
Status: COMPLETED
Start: 2018-10-08 | End: 2018-10-08

## 2018-10-08 RX ORDER — LORAZEPAM 2 MG/ML
1 INJECTION INTRAMUSCULAR ONCE
Status: COMPLETED | OUTPATIENT
Start: 2018-10-08 | End: 2018-10-08

## 2018-10-08 RX ORDER — SODIUM CHLORIDE 0.9 % (FLUSH) 0.9 %
5-10 SYRINGE (ML) INJECTION AS NEEDED
Status: DISCONTINUED | OUTPATIENT
Start: 2018-10-08 | End: 2018-10-11 | Stop reason: HOSPADM

## 2018-10-08 RX ORDER — CALCIUM CARBONATE 200(500)MG
1 TABLET,CHEWABLE ORAL AS NEEDED
COMMUNITY

## 2018-10-08 RX ORDER — LORAZEPAM 2 MG/ML
2 INJECTION INTRAMUSCULAR
Status: DISCONTINUED | OUTPATIENT
Start: 2018-10-08 | End: 2018-10-10

## 2018-10-08 RX ORDER — LEVOFLOXACIN 5 MG/ML
750 INJECTION, SOLUTION INTRAVENOUS
Status: DISCONTINUED | OUTPATIENT
Start: 2018-10-08 | End: 2018-10-11

## 2018-10-08 RX ORDER — LORAZEPAM 2 MG/ML
1 INJECTION INTRAMUSCULAR
Status: DISCONTINUED | OUTPATIENT
Start: 2018-10-08 | End: 2018-10-10

## 2018-10-08 RX ORDER — SODIUM CHLORIDE 0.9 % (FLUSH) 0.9 %
10 SYRINGE (ML) INJECTION
Status: COMPLETED | OUTPATIENT
Start: 2018-10-08 | End: 2018-10-08

## 2018-10-08 RX ORDER — LORAZEPAM 2 MG/ML
2 INJECTION INTRAMUSCULAR
Status: COMPLETED | OUTPATIENT
Start: 2018-10-08 | End: 2018-10-08

## 2018-10-08 RX ADMIN — LORAZEPAM 1 MG: 2 INJECTION INTRAMUSCULAR at 19:19

## 2018-10-08 RX ADMIN — SODIUM CHLORIDE 100 ML: 900 INJECTION, SOLUTION INTRAVENOUS at 19:22

## 2018-10-08 RX ADMIN — SODIUM CHLORIDE 241 ML: 900 INJECTION, SOLUTION INTRAVENOUS at 22:42

## 2018-10-08 RX ADMIN — Medication 10 ML: at 19:22

## 2018-10-08 RX ADMIN — PIPERACILLIN SODIUM,TAZOBACTAM SODIUM 3.38 G: 3; .375 INJECTION, POWDER, FOR SOLUTION INTRAVENOUS at 22:43

## 2018-10-08 RX ADMIN — SODIUM CHLORIDE 1000 ML: 900 INJECTION, SOLUTION INTRAVENOUS at 22:42

## 2018-10-08 RX ADMIN — LORAZEPAM 2 MG: 2 INJECTION INTRAMUSCULAR; INTRAVENOUS at 19:45

## 2018-10-08 RX ADMIN — SODIUM CHLORIDE 1000 ML: 900 INJECTION, SOLUTION INTRAVENOUS at 19:08

## 2018-10-08 RX ADMIN — IOPAMIDOL 100 ML: 755 INJECTION, SOLUTION INTRAVENOUS at 19:22

## 2018-10-08 RX ADMIN — LORAZEPAM 2 MG: 2 INJECTION INTRAMUSCULAR at 19:45

## 2018-10-08 RX ADMIN — SODIUM CHLORIDE 40 MG: 9 INJECTION INTRAMUSCULAR; INTRAVENOUS; SUBCUTANEOUS at 21:31

## 2018-10-08 RX ADMIN — LORAZEPAM 1 MG: 2 INJECTION INTRAMUSCULAR; INTRAVENOUS at 19:19

## 2018-10-08 NOTE — ED NOTES
Reggie Castillo MD at bedside. Patient noted to go unresponsive and appear to have sz. Moved to [address] room. Placed on monitor x3. Alert and oriented x4. No post dictal period noted.

## 2018-10-08 NOTE — IP AVS SNAPSHOT
[address] 
[phone] Patient: Laura Peraza MRN: [id number] V:4/67/4542 A check regina indicates which time of day the medication should be taken. My Medications START taking these medications Instructions Each Dose to Equal  
 Morning Noon Evening Bedtime  
 chlordiazePOXIDE 5 mg capsule Commonly known as:  LIBRIUM Your last dose was: Your next dose is: Take 1 Cap by mouth three (3) times daily as needed for Anxiety for up to 5 days. Max Daily Amount: 15 mg.  
 5 mg  
    
   
   
   
  
 levoFLOXacin 750 mg tablet Commonly known as:  Joe Santamaria Your last dose was: Your next dose is: Take 1 Tab by mouth every twenty-four (24) hours for 3 days. 750 mg CONTINUE taking these medications Instructions Each Dose to Equal  
 Morning Noon Evening Bedtime  
 calcium carbonate 200 mg calcium (500 mg) Chew Commonly known as:  TUMS Your last dose was: Your next dose is: Take 1 Tab by mouth as needed. 1 Tab  
    
   
   
   
  
 lisinopril 40 mg tablet Commonly known as:  Marek Dao Your last dose was: Your next dose is: Take 40 mg by mouth daily. 40 mg PEPTO-BISMOL PO Your last dose was: Your next dose is: Take  by mouth. PriLOSEC 10 mg capsule Generic drug:  omeprazole Your last dose was: Your next dose is: Take 10 mg by mouth daily. Indications: PREVENTION OF NSAID-INDUCED GASTRIC ULCER  
 10 mg Where to Get Your Medications Information on where to get these meds will be given to you by the nurse or doctor. ! Ask your nurse or doctor about these medications  
  chlordiazePOXIDE 5 mg capsule  
 levoFLOXacin 750 mg tablet

## 2018-10-08 NOTE — ED PROVIDER NOTES
HPI Comments: [de-identified] y.o. male with past medical history significant for HTN, GERD, s/p cholecystectomy, who presents ambulatory to the ED accompanied by spouse, with chief complaint of middle epigastric abdominal pain. Pt complains of intermittent middle epigastric abdominal pain that started ~1 week ago. Pt states that current pain is similar to pain felt with gall stones that were removed ~1 year ago. He rates his current pain as 5/10 in intensity. Pt states that he took Pepto-Bismol for his pain which provided temporary relief initially, but no longer provides relief. Pt denies nausea or vomiting. Pt notes that he drinks beer and that his last drink was ~1 week ago. He denies any h/o ulcers, heart problems or pancreatitis. There are no other acute medical concerns at this time. Social hx: Tobacco use (former smoker, quit date: 1/1/1996); Positive for EtOH use; Negative for Illicit Drug use PCP: Diana Casey MD 
 
Note written by Linda Zhu, as dictated by Marcelle Jenkins MD 7:00 PM  
 
 
The history is provided by the patient and medical records. No  was used. Past Medical History:  
Diagnosis Date  GERD (gastroesophageal reflux disease)  Hypertension Past Surgical History:  
Procedure Laterality Date  COLONOSCOPY Left 11/20/2017 COLONOSCOPY performed by Alejandra Hudson MD at [address] [place] [address] COLONOSCOPY  5/2013  
 with polyps History reviewed. No pertinent family history. Social History Social History  Marital status:  Spouse name: N/A  
 Number of children: N/A  
 Years of education: N/A Occupational History  Not on file. Social History Main Topics  Smoking status: Former Smoker Packs/day: 0.25 Years: 11.00 Quit date: 1/1/1966  Smokeless tobacco: Not on file  Alcohol use Yes Comment: occasional  
 Drug use: Not on file  Sexual activity: Not on file Other Topics Concern  Not on file Social History Narrative ALLERGIES: Review of patient's allergies indicates no known allergies. Review of Systems Constitutional: Negative for chills, diaphoresis and fever. HENT: Negative for congestion, postnasal drip, rhinorrhea and sore throat. Eyes: Negative for photophobia, discharge, redness and visual disturbance. Respiratory: Negative for cough, chest tightness, shortness of breath and wheezing. Cardiovascular: Negative for chest pain, palpitations and leg swelling. Gastrointestinal: Positive for abdominal pain (Middle epigastric). Negative for abdominal distention, blood in stool, constipation, diarrhea, nausea and vomiting. Genitourinary: Negative for difficulty urinating, dysuria, frequency, hematuria and urgency. Musculoskeletal: Negative for arthralgias, back pain, joint swelling and myalgias. Skin: Negative for color change and rash. Neurological: Negative for dizziness, speech difficulty, weakness, light-headedness, numbness and headaches. Psychiatric/Behavioral: Negative for confusion. The patient is not nervous/anxious. All other systems reviewed and are negative. Vitals:  
 10/08/18 1820 BP: 153/89 Pulse: (!) 103 Resp: 16 Temp: 98 °F (36.7 °C) SpO2: 98% Weight: 74.7 kg (164 lb 10.9 oz) Height: 5' 8\" (1.727 m) Physical Exam  
Constitutional: He is oriented to person, place, and time. He appears well-developed and well-nourished. No distress. HENT:  
Head: Normocephalic and atraumatic. Right Ear: External ear normal.  
Left Ear: External ear normal.  
Nose: Nose normal.  
Mouth/Throat: Oropharynx is clear and moist.  
Eyes: Conjunctivae and EOM are normal. Pupils are equal, round, and reactive to light. No scleral icterus. Neck: Normal range of motion. Neck supple. No JVD present. No tracheal deviation present. No thyromegaly present. Cardiovascular: Normal rate, regular rhythm and normal heart sounds. Exam reveals no gallop and no friction rub. No murmur heard. Pulmonary/Chest: Effort normal and breath sounds normal. No respiratory distress. He has no wheezes. He has no rales. He exhibits no tenderness. Abdominal: There is tenderness. Epigastric tenderness Musculoskeletal: Normal range of motion. He exhibits no edema or tenderness. Lymphadenopathy:  
  He has no cervical adenopathy. Neurological: He is alert and oriented to person, place, and time. He has normal strength. He displays no atrophy and no tremor. No cranial nerve deficit. He exhibits normal muscle tone. Coordination and gait normal.  
Skin: Skin is warm and dry. No rash noted. He is not diaphoretic. No erythema. Psychiatric: He has a normal mood and affect. His behavior is normal. Judgment and thought content normal.  
Nursing note and vitals reviewed. Note written by Linda Hernandez, as dictated by Lennox Cartagena MD 7:00 PM 
 
MDM Number of Diagnoses or Management Options Abdominal pain, epigastric:  
Alcohol withdrawal syndrome with complication [facility]):  
Diagnosis management comments: VARGHESE Impression: 39-year-old male presenting to the emergency department with acute onset of epigastric pain which has been worsening over the last week. Please refer to the history of present illness for all pertinent that occurred while in room 431. Differential includes pancreatitis, biliary stones, AAA. Peptic ulcer disease perforated ulcer. remotely consider nephrolithiasis. Also likely to be an alcohol withdrawal and perhaps the brief seizure that the patient had was either secondary to pain and/or alcohol withdrawal. 
 
Plan of care we baseline labs, the patient will go for CTA of the abdomen and pelvis to rule out AAA will also obtain a head CT scan as well as routine lab studies lipase etc. 
 
 
Critical Care Total time providing critical care: (Total critical care time spend exclusive of procedures: 45 minutes ) 
 
 
 
ED Course Procedures PROGRESS NOTE: 
6:58 PM 
During physical exam, patient became unresponsive. Started with tonic then clonic generalized seizure activity lasting about 30 seconds. Pulse was present during whole seizure. No cyanosis noted. Regained conciousness and quickly moved to room 7. PROGRESS NOTE: 
7:08 PM 
Patient was reevaluated. Patient appears pale, but alert. He is at times confused over his story. States that abdominal pain is better. ED EKG interpretation: 
Time: 1826 PM 
Rhythm: Sinus rhythm with marked sinus arrhythmia. Rate (approx.): 86 bpm; Axis: left axis deviation; ST/T wave: nonspecific ST abnormality Note written by Linda Ramos, as dictated by Erum Lou MD 7:00 PM 
 
CONSULT NOTE: 
8:25 PM Erum Lou MD spoke with Dr. Jayden Glass MD, Consult for Cardiology. Discussed available diagnostic tests and clinical findings. Dr. Breonna Lozano will evaluate the patient for admission to the hospital. 
 
8:19 PM 
Patient is being admitted to the hospital.  The results of their tests and reasons for their admission have been discussed with them and/or available family. They convey agreement and understanding for the need to be admitted and for their admission diagnosis. Consultation will be made now with the inpatient physician for hospitalization.

## 2018-10-08 NOTE — ED NOTES
1920: Pt to CT with RN on monitor, pt given 1mg Ativan prior to travel as pt remains tremorous but alert and oriented 1930: Pt remains tremorous in CT but alert and continues to states he is cold 1950: Pt is less oriented stating it is 2009, verbal order given for Ammonia 2010: Pt pulled L IV line out and becoming less oriented 2015: Order for soft restraints given by MD 
2100: Pt cleaned of incontinence care, pt alert and not oriented, pt stating he said surgery today 2125: Hospitalist at bedside. Pt's son, Fiona Cea [phone] will be notified regarding pt status 2200: Pt cleaned of incontinence care as pt tore apart brief 2245: Sepsis orders complete, Hospitalist stated to ice pt down because he can not have Tylenol d/t his liver function. Pt given 2mg Ativan for CIWA 19 
2310: Patient left department with RN on cardiac monitor for transportation to inpatient bed. Patient's VS at the time of transfer were /64, 100 on RA, denies pain at this time, 100 orally,  rhythm on the monitor. Patient was alert and oriented x 0 and denies further needs at time of transfer. Patient's family went home prior to transfer to floor. TRANSFER - OUT REPORT: 
 
Verbal report given to IFTIKHAR Ashton(name) on Laura Peraza  being transferred to ICU 1(unit) for routine progression of care Report consisted of patients Situation, Background, Assessment and  
Recommendations(SBAR). Information from the following report(s) SBAR, Kardex, ED Summary, [facility] and Recent Results was reviewed with the receiving nurse. Opportunity for questions and clarification was provided.

## 2018-10-08 NOTE — IP AVS SNAPSHOT
[address] 
[phone] Patient: Jerline Phoenix MRN: [id number] RCF:1/80/0814 About your hospitalization You were admitted on:  October 8, 2018 You last received care in the:  [address] [place] [address] You were discharged on:  October 11, 2018 Why you were hospitalized Your primary diagnosis was:  Stroke (Hcc) Your diagnoses also included:  Seizure (Hcc), Altered Mental Status, Elevated Lfts Follow-up Information Follow up With Details Comments Contact Info Betsy Garcia MD In 1 week Please call the practice to schedule your appointment  [address] [place] [id number] 
[phone] [employer]  On 10/13/2018 [facility] will call the patient on Saturday October 13 @ 9:00 a.m. [phone] Discharge Orders None A check regina indicates which time of day the medication should be taken. My Medications START taking these medications Instructions Each Dose to Equal  
 Morning Noon Evening Bedtime  
 chlordiazePOXIDE 5 mg capsule Commonly known as:  LIBRIUM Your last dose was: Your next dose is: Take 1 Cap by mouth three (3) times daily as needed for Anxiety for up to 5 days. Max Daily Amount: 15 mg.  
 5 mg  
    
   
   
   
  
 levoFLOXacin 750 mg tablet Commonly known as:  Ranjan Pond Your last dose was: Your next dose is: Take 1 Tab by mouth every twenty-four (24) hours for 3 days. 750 mg CONTINUE taking these medications Instructions Each Dose to Equal  
 Morning Noon Evening Bedtime  
 calcium carbonate 200 mg calcium (500 mg) Chew Commonly known as:  TUMS Your last dose was: Your next dose is: Take 1 Tab by mouth as needed. 1 Tab  
    
   
   
   
  
 lisinopril 40 mg tablet Commonly known as:  Danny Cartagena  
   
 Your last dose was: Your next dose is: Take 40 mg by mouth daily. 40 mg PEPTO-BISMOL PO Your last dose was: Your next dose is: Take  by mouth. PriLOSEC 10 mg capsule Generic drug:  omeprazole Your last dose was: Your next dose is: Take 10 mg by mouth daily. Indications: PREVENTION OF NSAID-INDUCED GASTRIC ULCER  
 10 mg Where to Get Your Medications Information on where to get these meds will be given to you by the nurse or doctor. ! Ask your nurse or doctor about these medications  
  chlordiazePOXIDE 5 mg capsule  
 levoFLOXacin 750 mg tablet Discharge Instructions Discharge Instructions PATIENT ID: Wilber Baeza MRN: [id number] YOB: 1940 DATE OF ADMISSION: 10/8/2018  6:32 PM   
DATE OF DISCHARGE: 10/11/2018 PRIMARY CARE PROVIDER: Navin Simeon MD  
 
ATTENDING PHYSICIAN: Kristan Morataya MD 
DISCHARGING PROVIDER: Kristan Morataya MD   
To contact this individual call [phone] and ask the  to page. If unavailable ask to be transferred the Adult Hospitalist Department. DISCHARGE DIAGNOSES ETOH abuse CONSULTATIONS: IP CONSULT TO HOSPITALIST 
IP CONSULT TO TELE-NEUROLOGY 
IP CONSULT TO INTENSIVIST 
 
PROCEDURES/SURGERIES: * No surgery found * PENDING TEST RESULTS:  
At the time of discharge the following test results are still pending: FOLLOW UP APPOINTMENTS:  
Follow-up Information Follow up With Details Comments Contact Info Navin Simeon MD In 1 week  [address] [place] [address] [address] [place] [id number] [phone] ADDITIONAL CARE RECOMMENDATIONS:  
 
DIET: Regular Diet and Cardiac Diet ACTIVITY: Activity as tolerated WOUND CARE:  
 
EQUIPMENT needed:  
 
 
DISCHARGE MEDICATIONS: 
 See Medication Reconciliation Form · It is important that you take the medication exactly as they are prescribed. · Keep your medication in the bottles provided by the pharmacist and keep a list of the medication names, dosages, and times to be taken in your wallet. · Do not take other medications without consulting your doctor. NOTIFY YOUR PHYSICIAN FOR ANY OF THE FOLLOWING:  
Fever over 101 degrees for 24 hours. Chest pain, shortness of breath, fever, chills, nausea, vomiting, diarrhea, change in mentation, falling, weakness, bleeding. Severe pain or pain not relieved by medications. Or, any other signs or symptoms that you may have questions about. DISPOSITION: 
x  Home With: 
 OT  PT  [facility]  RN  
  
 SNF/Inpatient Rehab/LTAC Independent/assisted living Hospice Other: CDMP Checked:  
Yes x PROBLEM LIST Updated: 
Yes x Signed:  
Jordyn Mireles MD 
10/11/2018 
9:13 AM 
 
Valderm Activation Thank you for requesting access to [employer]der[employer]. Please follow the instructions below to securely access and download your online medical record. Valderm allows you to send messages to your doctor, view your test results, renew your prescriptions, schedule appointments, and more. How Do I Sign Up? 1. In your internet browser, go to [email or web address] 
2. Click on the First Time User? Click Here link in the Sign In box. You will be redirect to the New Member Sign Up page. 3. Enter your [employer] Access Code exactly as it appears below. You will not need to use this code after youve completed the sign-up process. If you do not sign up before the expiration date, you must request a new code. Valderm Access Code: Activation code not generated Current Valderm Status: Active (This is the date your Valderm access code will ) 4. Enter the last four digits of your Social Security Number (xxxx) and Date of Birth (mm/dd/yyyy) as indicated and click Submit. You will be taken to the next sign-up page. 5. Create a [employer] ID. This will be your [employer] login ID and cannot be changed, so think of one that is secure and easy to remember. 6. Create a [employer] password. You can change your password at any time. 7. Enter your Password Reset Question and Answer. This can be used at a later time if you forget your password. 8. Enter your e-mail address. You will receive e-mail notification when new information is available in [address] 9. Click Sign Up. You can now view and download portions of your medical record. 10. Click the Download Summary menu link to download a portable copy of your medical information. Additional Information If you have questions, please visit the Frequently Asked Questions section of the [employer] website at [email or web address] Remember, [employer] is NOT to be used for urgent needs. For medical emergencies, dial 911. DISCHARGE SUMMARY from Nurse PATIENT INSTRUCTIONS: 
 
 
 
These are general instructions for a healthy lifestyle: No smoking/ No tobacco products/ Avoid exposure to second hand smoke Surgeon General's Warning:  Quitting smoking now greatly reduces serious risk to your health. Obesity, smoking, and sedentary lifestyle greatly increases your risk for illness A healthy diet, regular physical exercise & weight monitoring are important for maintaining a healthy lifestyle You may be retaining fluid if you have a history of heart failure or if you experience any of the following symptoms:  Weight gain of 3 pounds or more overnight or 5 pounds in a week, increased swelling in our hands or feet or shortness of breath while lying flat in bed. Please call your doctor as soon as you notice any of these symptoms; do not wait until your next office visit. Recognize signs and symptoms of STROKE: 
 
F-face looks uneven A-arms unable to move or move unevenly S-speech slurred or non-existent T-time-call 911 as soon as signs and symptoms begin-DO NOT go Back to bed or wait to see if you get better-TIME IS BRAIN. Warning Signs of HEART ATTACK Call 911 if you have these symptoms: 
? Chest discomfort. Most heart attacks involve discomfort in the center of the chest that lasts more than a few minutes, or that goes away and comes back. It can feel like uncomfortable pressure, squeezing, fullness, or pain. ? Discomfort in other areas of the upper body. Symptoms can include pain or discomfort in one or both arms, the back, neck, jaw, or stomach. ? Shortness of breath with or without chest discomfort. ? Other signs may include breaking out in a cold sweat, nausea, or lightheadedness. Don't wait more than five minutes to call [address]! Fast action can save your life. Calling 911 is almost always the fastest way to get lifesaving treatment. Emergency Medical Services staff can begin treatment when they arrive  up to an hour sooner than if someone gets to the hospital by car. The discharge information has been reviewed with the patient. The patient verbalized understanding. Discharge medications reviewed with the patient and appropriate educational materials and side effects teaching were provided. ___________________________________________________________________________________________________________________________________ Introducing hospitals & HEALTH SERVICES! Dear Surjit Banks: Thank you for requesting a [employer] account. Our records indicate that you already have an active [employer] account. You can access your account anytime at [email or web address] Did you know that you can access your hospital and ER discharge instructions at any time in [employer]? You can also review all of your test results from your hospital stay or ER visit. Additional Information If you have questions, please visit the Frequently Asked Questions section of the [employer] website at [email or web address] Remember, [employer] is NOT to be used for urgent needs. For medical emergencies, dial 911. Now available from your iPhone and Android! Introducing Marquez Garland As a [employer] patient, I wanted to make you aware of our electronic visit tool called Marquez Garland. [employer] 24/7 allows you to connect within minutes with a medical provider 24 hours a day, seven days a week via a mobile device or tablet or logging into a secure website from your computer. You can access Marquez Garland from anywhere in the [place]. A virtual visit might be right for you when you have a simple condition and feel like you just dont want to get out of bed, or cant get away from work for an appointment, when your regular [employer] provider is not available (evenings, weekends or holidays), or when youre out of town and need minor care. Electronic visits cost only $49 and if the [employer] 24/7 provider determines a prescription is needed to treat your condition, one can be electronically transmitted to a nearby pharmacy*. Please take a moment to enroll today if you have not already done so. The enrollment process is free and takes just a few minutes. To enroll, please download the [employer] 24/7 aiden to your tablet or phone, or visit [email or web address] to enroll on your computer. And, as an [address] [place] [address] patient with a [employer] account, the results of your visits will be scanned into your electronic medical record and your primary care provider will be able to view the scanned results. We urge you to continue to see your regular [employer] provider for your ongoing medical care. And while your primary care provider may not be the one available when you seek a Marquez Garland virtual visit, the peace of mind you get from getting a real diagnosis real time can be priceless. For more information on Marquez Sandersongiorgifin, view our Frequently Asked Questions (FAQs) at [email or web address]. Sincerely, 
 
Nehal Yang MD 
Chief Medical Officer Lucie Doss *:  certain medications cannot be prescribed via Marquez Kinyeni Unresulted Labs-Please follow up with your PCP about these lab tests Order Current Status CULTURE, BLOOD, PAIRED Preliminary result Providers Seen During Your Hospitalization Provider Specialty Primary office phone Virginia Ortega MD Emergency Medicine [phone] Majo Espinosa MD Family Practice [phone] Jung Maldonado MD Internal Medicine [phone] Immunizations Administered for This Admission Name Date Influenza Vaccine (Quad) PF 10/11/2018 Your Primary Care Physician (PCP) Primary Care Physician Office Phone Office Fax [facility], [address] [place] [address] [phone] You are allergic to the following No active allergies Recent Documentation Height Weight BMI Smoking Status 1.727 m 80.5 kg 26.98 kg/m2 Former Smoker Emergency Contacts Name Discharge Info Relation Home Work Mobile Piper Osman DISCHARGE CAREGIVER [3] Friend [5] [phone] Brigida Query  Son [22]   [phone] Patient Belongings The following personal items are in your possession at time of discharge: 
  Dental Appliances: None  Visual Aid: None      Home Medications: None   Jewelry: None  Clothing: At bedside    Other Valuables: None Please provide this summary of care documentation to your next provider. Signatures-by signing, you are acknowledging that this After Visit Summary has been reviewed with you and you have received a copy. Patient Signature:  ____________________________________________________________  Date:  ____________________________________________________________  
  
Mariusz Aguirre    
    
 Provider Signature:  ____________________________________________________________ Date:  ____________________________________________________________

## 2018-10-08 NOTE — ED TRIAGE NOTES
Pt to ED for mid lower CP/epigastric pain that began a few days ago. Denies N/V, SOB. Reports reports pain is worse when supine. Took Pepto without relief.

## 2018-10-09 LAB
ALBUMIN SERPL-MCNC: 2.9 G/DL (ref 3.5–5)
ALBUMIN/GLOB SERPL: 0.9 {RATIO} (ref 1.1–2.2)
ALP SERPL-CCNC: 224 U/L (ref 45–117)
ALT SERPL-CCNC: 602 U/L (ref 12–78)
ANION GAP SERPL CALC-SCNC: 5 MMOL/L (ref 5–15)
APPEARANCE UR: CLEAR
AST SERPL-CCNC: 628 U/L (ref 15–37)
ATRIAL RATE: 86 BPM
BASOPHILS # BLD: 0 K/UL (ref 0–0.1)
BASOPHILS NFR BLD: 1 % (ref 0–1)
BILIRUB SERPL-MCNC: 2.8 MG/DL (ref 0.2–1)
BILIRUB UR QL: NEGATIVE
BUN SERPL-MCNC: 12 MG/DL (ref 6–20)
BUN/CREAT SERPL: 10 (ref 12–20)
CALCIUM SERPL-MCNC: 7.7 MG/DL (ref 8.5–10.1)
CALCULATED P AXIS, ECG09: -3 DEGREES
CALCULATED R AXIS, ECG10: -33 DEGREES
CALCULATED T AXIS, ECG11: -5 DEGREES
CHLORIDE SERPL-SCNC: 112 MMOL/L (ref 97–108)
CO2 SERPL-SCNC: 25 MMOL/L (ref 21–32)
COLOR UR: ABNORMAL
CREAT SERPL-MCNC: 1.23 MG/DL (ref 0.7–1.3)
DIAGNOSIS, 93000: NORMAL
DIFFERENTIAL METHOD BLD: ABNORMAL
EOSINOPHIL # BLD: 0.1 K/UL (ref 0–0.4)
EOSINOPHIL NFR BLD: 1 % (ref 0–7)
ERYTHROCYTE [DISTWIDTH] IN BLOOD BY AUTOMATED COUNT: 12.6 % (ref 11.5–14.5)
GLOBULIN SER CALC-MCNC: 3.3 G/DL (ref 2–4)
GLUCOSE BLD STRIP.AUTO-MCNC: 79 MG/DL (ref 65–100)
GLUCOSE SERPL-MCNC: 75 MG/DL (ref 65–100)
GLUCOSE UR STRIP.AUTO-MCNC: NEGATIVE MG/DL
HCT VFR BLD AUTO: 36.7 % (ref 36.6–50.3)
HGB BLD-MCNC: 13 G/DL (ref 12.1–17)
HGB UR QL STRIP: ABNORMAL
IMM GRANULOCYTES # BLD: 0 K/UL (ref 0–0.04)
IMM GRANULOCYTES NFR BLD AUTO: 0 % (ref 0–0.5)
KETONES UR QL STRIP.AUTO: NEGATIVE MG/DL
LEUKOCYTE ESTERASE UR QL STRIP.AUTO: NEGATIVE
LYMPHOCYTES # BLD: 1.2 K/UL (ref 0.8–3.5)
LYMPHOCYTES NFR BLD: 16 % (ref 12–49)
MCH RBC QN AUTO: 33 PG (ref 26–34)
MCHC RBC AUTO-ENTMCNC: 35.4 G/DL (ref 30–36.5)
MCV RBC AUTO: 93.1 FL (ref 80–99)
MONOCYTES # BLD: 0.9 K/UL (ref 0–1)
MONOCYTES NFR BLD: 11 % (ref 5–13)
NEUTS SEG # BLD: 5.7 K/UL (ref 1.8–8)
NEUTS SEG NFR BLD: 71 % (ref 32–75)
NITRITE UR QL STRIP.AUTO: NEGATIVE
NRBC # BLD: 0 K/UL (ref 0–0.01)
NRBC BLD-RTO: 0 PER 100 WBC
P-R INTERVAL, ECG05: 152 MS
PH UR STRIP: 7.5 [PH] (ref 5–8)
PLATELET # BLD AUTO: 238 K/UL (ref 150–400)
PMV BLD AUTO: 9.3 FL (ref 8.9–12.9)
POTASSIUM SERPL-SCNC: 4 MMOL/L (ref 3.5–5.1)
PROT SERPL-MCNC: 6.2 G/DL (ref 6.4–8.2)
PROT UR STRIP-MCNC: NEGATIVE MG/DL
Q-T INTERVAL, ECG07: 338 MS
QRS DURATION, ECG06: 72 MS
QTC CALCULATION (BEZET), ECG08: 404 MS
RBC # BLD AUTO: 3.94 M/UL (ref 4.1–5.7)
SERVICE CMNT-IMP: NORMAL
SODIUM SERPL-SCNC: 142 MMOL/L (ref 136–145)
SP GR UR REFRACTOMETRY: 1 (ref 1–1.03)
UROBILINOGEN UR QL STRIP.AUTO: 1 EU/DL (ref 0.2–1)
VENTRICULAR RATE, ECG03: 86 BPM
WBC # BLD AUTO: 7.9 K/UL (ref 4.1–11.1)

## 2018-10-09 PROCEDURE — 74011000250 HC RX REV CODE- 250: Performed by: INTERNAL MEDICINE

## 2018-10-09 PROCEDURE — 80053 COMPREHEN METABOLIC PANEL: CPT | Performed by: FAMILY MEDICINE

## 2018-10-09 PROCEDURE — 81001 URINALYSIS AUTO W/SCOPE: CPT | Performed by: FAMILY MEDICINE

## 2018-10-09 PROCEDURE — 77030034850

## 2018-10-09 PROCEDURE — 74011250636 HC RX REV CODE- 250/636: Performed by: FAMILY MEDICINE

## 2018-10-09 PROCEDURE — 36415 COLL VENOUS BLD VENIPUNCTURE: CPT | Performed by: FAMILY MEDICINE

## 2018-10-09 PROCEDURE — 74011000258 HC RX REV CODE- 258: Performed by: FAMILY MEDICINE

## 2018-10-09 PROCEDURE — 74011250636 HC RX REV CODE- 250/636: Performed by: INTERNAL MEDICINE

## 2018-10-09 PROCEDURE — C9113 INJ PANTOPRAZOLE SODIUM, VIA: HCPCS | Performed by: INTERNAL MEDICINE

## 2018-10-09 PROCEDURE — 77030032490 HC SLV COMPR SCD KNE COVD -B

## 2018-10-09 PROCEDURE — 74011000258 HC RX REV CODE- 258: Performed by: HOSPITALIST

## 2018-10-09 PROCEDURE — 74011000250 HC RX REV CODE- 250: Performed by: FAMILY MEDICINE

## 2018-10-09 PROCEDURE — 85025 COMPLETE CBC W/AUTO DIFF WBC: CPT | Performed by: FAMILY MEDICINE

## 2018-10-09 PROCEDURE — 82962 GLUCOSE BLOOD TEST: CPT

## 2018-10-09 PROCEDURE — 74011000258 HC RX REV CODE- 258: Performed by: INTERNAL MEDICINE

## 2018-10-09 PROCEDURE — 65610000006 HC RM INTENSIVE CARE

## 2018-10-09 PROCEDURE — 77030011943

## 2018-10-09 RX ORDER — HEPARIN SODIUM 5000 [USP'U]/ML
5000 INJECTION, SOLUTION INTRAVENOUS; SUBCUTANEOUS EVERY 8 HOURS
Status: DISCONTINUED | OUTPATIENT
Start: 2018-10-09 | End: 2018-10-11 | Stop reason: HOSPADM

## 2018-10-09 RX ORDER — SODIUM CHLORIDE 0.9 % (FLUSH) 0.9 %
5-10 SYRINGE (ML) INJECTION AS NEEDED
Status: DISCONTINUED | OUTPATIENT
Start: 2018-10-09 | End: 2018-10-11 | Stop reason: HOSPADM

## 2018-10-09 RX ORDER — IPRATROPIUM BROMIDE AND ALBUTEROL SULFATE 2.5; .5 MG/3ML; MG/3ML
3 SOLUTION RESPIRATORY (INHALATION)
Status: DISCONTINUED | OUTPATIENT
Start: 2018-10-09 | End: 2018-10-11 | Stop reason: HOSPADM

## 2018-10-09 RX ORDER — SODIUM CHLORIDE 0.9 % (FLUSH) 0.9 %
5-10 SYRINGE (ML) INJECTION EVERY 8 HOURS
Status: DISCONTINUED | OUTPATIENT
Start: 2018-10-09 | End: 2018-10-11 | Stop reason: HOSPADM

## 2018-10-09 RX ORDER — DEXTROSE MONOHYDRATE AND SODIUM CHLORIDE 5; .9 G/100ML; G/100ML
50 INJECTION, SOLUTION INTRAVENOUS CONTINUOUS
Status: DISCONTINUED | OUTPATIENT
Start: 2018-10-09 | End: 2018-10-10

## 2018-10-09 RX ADMIN — FOLIC ACID: 5 INJECTION, SOLUTION INTRAMUSCULAR; INTRAVENOUS; SUBCUTANEOUS at 00:21

## 2018-10-09 RX ADMIN — FOLIC ACID: 5 INJECTION, SOLUTION INTRAMUSCULAR; INTRAVENOUS; SUBCUTANEOUS at 22:32

## 2018-10-09 RX ADMIN — LORAZEPAM 2 MG: 2 INJECTION INTRAMUSCULAR; INTRAVENOUS at 00:00

## 2018-10-09 RX ADMIN — Medication 10 ML: at 21:20

## 2018-10-09 RX ADMIN — DEXTROSE MONOHYDRATE AND SODIUM CHLORIDE 50 ML/HR: 5; .9 INJECTION, SOLUTION INTRAVENOUS at 11:03

## 2018-10-09 RX ADMIN — SODIUM CHLORIDE 40 MG: 9 INJECTION INTRAMUSCULAR; INTRAVENOUS; SUBCUTANEOUS at 09:09

## 2018-10-09 RX ADMIN — PIPERACILLIN SODIUM,TAZOBACTAM SODIUM 3.38 G: 3; .375 INJECTION, POWDER, FOR SOLUTION INTRAVENOUS at 13:50

## 2018-10-09 RX ADMIN — Medication 10 ML: at 14:00

## 2018-10-09 RX ADMIN — HEPARIN SODIUM 5000 UNITS: 5000 INJECTION INTRAVENOUS; SUBCUTANEOUS at 11:22

## 2018-10-09 RX ADMIN — PIPERACILLIN SODIUM,TAZOBACTAM SODIUM 3.38 G: 3; .375 INJECTION, POWDER, FOR SOLUTION INTRAVENOUS at 21:20

## 2018-10-09 RX ADMIN — HEPARIN SODIUM 5000 UNITS: 5000 INJECTION INTRAVENOUS; SUBCUTANEOUS at 19:34

## 2018-10-09 RX ADMIN — SODIUM CHLORIDE 40 MG: 9 INJECTION INTRAMUSCULAR; INTRAVENOUS; SUBCUTANEOUS at 21:19

## 2018-10-09 RX ADMIN — PIPERACILLIN SODIUM,TAZOBACTAM SODIUM 3.38 G: 3; .375 INJECTION, POWDER, FOR SOLUTION INTRAVENOUS at 06:27

## 2018-10-09 RX ADMIN — Medication 10 ML: at 09:10

## 2018-10-09 RX ADMIN — LEVOFLOXACIN 750 MG: 5 INJECTION, SOLUTION INTRAVENOUS at 00:21

## 2018-10-09 RX ADMIN — SODIUM CHLORIDE 0.4 MCG/KG/HR: 900 INJECTION, SOLUTION INTRAVENOUS at 01:49

## 2018-10-09 RX ADMIN — LORAZEPAM 2 MG: 2 INJECTION INTRAMUSCULAR; INTRAVENOUS at 01:51

## 2018-10-09 NOTE — PROGRESS NOTES
I returned page from nurse who reports that patient remains extremely agitated despite receiving Ativan with alcohol withdrawal.  Patient has been transferred to the ICU. Will consult with intensivist regarding further sedation, possibly Precedex while in ICU and now approaching another tier of sedation requirements.

## 2018-10-09 NOTE — PROGRESS NOTES
Problem: Discharge Planning Goal: *Discharge to safe environment Outcome: Progressing Towards Goal 
See care management note

## 2018-10-09 NOTE — PROGRESS NOTES
Problem: Falls - Risk of 
Goal: *Absence of Falls Document Monet Luz Fall Risk and appropriate interventions in the flowsheet. Outcome: Progressing Towards Goal 
Fall Risk Interventions: 
Mobility Interventions: Communicate number of staff needed for ambulation/transfer, Patient to call before getting OOB Mentation Interventions: Adequate sleep, hydration, pain control, Door open when patient unattended Medication Interventions: Evaluate medications/consider consulting pharmacy, Patient to call before getting OOB, Teach patient to arise slowly Elimination Interventions: Call light in reach, Toileting schedule/hourly rounds Problem: Pressure Injury - Risk of 
Goal: *Prevention of pressure injury Document El Scale and appropriate interventions in the flowsheet. Outcome: Progressing Towards Goal 
Pressure Injury Interventions: 
  
 
Moisture Interventions: Apply protective barrier, creams and emollients, Absorbent underpads, Internal/External urinary devices, Maintain skin hydration (lotion/cream) Activity Interventions: Pressure redistribution bed/mattress(bed type) Nutrition Interventions: Document food/fluid/supplement intake

## 2018-10-09 NOTE — PROGRESS NOTES
0130-  Ativan administered, without relief of agitation/anxiety. Juliana Ayon for Precedex gtt. Order received.

## 2018-10-09 NOTE — CONSULTS
[facility]  Pulmonary, Critical Care, and Sleep Medicine  Name: Laura Peraza MRN: [id number]   : 1940 [facility]   Date: 10/9/2018          IMPRESSION:   1. AMS with EtOH withdrawal  2. Seizures-> new onset likely from above; head CT w/o focal findings  3. Elevated LFTs  4. Label sepsis- no clear source--> cx'ed and started on empiric abx  5. Presenting c/o Abd pain- known GERD--> neg abd CT  6. HTN  7. Incidental pulmonary nodule on CT      RECOMMENDATIONS:   · ICU observation  · IVF hydration  · CIWA protocol-> BNZ/precedex/goodies  · Empiric abx for now ( LQ/Zosyn)  · neuro eval  · PPI  · DVT prophylaxis SCDs-> add chemical rx if no sign bleeding  · Needs elective f/u CT scan chest 6-12 month for pulm nodule       Radiology  ( personally reviewed) CXR and CTs abd/pelvis head reviewed   ABG No results for input(s): PHI, PO2I, PCO2I in the last 72 hours. Subjective/Interval History:   [de-identified] yo male with EtOH abuse  To ED w abd pain (known GERD past)   Sz witnessed  Fever tachy--> cx and started abx  CT abd bland  Head CT no acute bleed  AMS-agitation--> CIWA + Precedex  This am calm on RX  oriented to self    Patient Active Problem List   Diagnosis Code    Seizure ([facility] [address] R56.9    Altered mental status R41.82    Stroke ([facility] [address] [facility] I63.9    Elevated LFTs R94.5     Past Medical History:   Diagnosis Date    GERD (gastroesophageal reflux disease)     Hypertension      Past Surgical History:   Procedure Laterality Date    COLONOSCOPY Left 2017    [facility] at [facility]OSCOPY    HX CHOLECYSTECTOMY      HX COLONOSCOPY  2013    with polyps     Prior to Admission Medications   Prescriptions Last Dose Informant Patient Reported? Taking?   bismuth subsalicylate (PEPTO-BISMOL PO)   Yes Yes   Sig: Take  by mouth.   calcium carbonate (TUMS) 200 mg calcium (500 mg) chew   Yes Yes   Sig: Take 1 Tab by mouth as needed.    lisinopril (PRINIVIL, ZESTRIL) 40 mg tablet   Yes No   Sig: Take 40 mg by mouth daily. omeprazole (PRILOSEC) 10 mg capsule   Yes No   Sig: Take 10 mg by mouth daily. Indications: PREVENTION OF NSAID-INDUCED GASTRIC ULCER      Facility-Administered Medications: None     No Known Allergies     Social History     Social History    Marital status:      Spouse name: N/A    Number of children: N/A    Years of education: N/A     Occupational History    Not on file. Social History Main Topics    Smoking status: Former Smoker     Packs/day: 0.25     Years: 11.00     Quit date: 1/1/1966    Smokeless tobacco: Not on file    Alcohol use Yes      Comment: occasional    Drug use: Not on file    Sexual activity: Not on file     Other Topics Concern    Not on file     Social History Narrative     History reviewed. No pertinent family history.     ROS unobtainable (mental status)    Objective:     Vital Signs:    Patient Vitals for the past 24 hrs:   Temp Pulse Resp BP SpO2   10/09/18 0700 - 73 17 119/71 100 %   10/09/18 0600 - 77 19 109/83 100 %   10/09/18 0500 - 80 11 118/76 99 %   10/09/18 0400 98.8 °F (37.1 °C) 81 10 92/78 100 %   10/09/18 0300 - 82 17 94/74 98 %   10/09/18 0200 - (!) 103 20 98/64 100 %   10/09/18 0100 - (!) 112 24 127/74 100 %   10/09/18 0000 98.8 °F (37.1 °C) (!) 107 25 107/76 100 %   10/08/18 2256 - (!) 112 29 123/64 100 %   10/08/18 2255 - (!) 111 24 123/64 100 %   10/08/18 2241 - (!) 114 26 119/65 100 %   10/08/18 2223 - - - 104/76 -   10/08/18 2222 - (!) 118 19 - 100 %   10/08/18 2125 100 °F (37.8 °C) (!) 128 24 (!) 139/103 100 %   10/08/18 2035 - (!) 124 28 94/84 96 %   10/08/18 2015 - (!) 119 22 96/57 99 %   10/08/18 2000 - (!) 116 23 (!) 119/97 94 %   10/08/18 1945 - (!) 117 22 156/88 99 %   10/08/18 1908 98.4 °F (36.9 °C) 73 24 108/67 97 %   10/08/18 1904 - - - (!) 114/99 -   10/08/18 1820 98 °F (36.7 °C) (!) 103 16 153/89 98 %        Intake/Output:   Last shift:         Last 3 shifts: RRIOLAST3  Intake/Output Summary (Last 24 hours) at 10/09/18 0802  Last data filed at 10/09/18 0700   Gross per 24 hour   Intake          2811.34 ml   Output              440 ml   Net          2371.34 ml     EXAM:     Chronically ill WM  In bed with wrist restraints  NC/AT  PERRL  Moist MM  Neck supple  Chest clear anteriorly  CV S1S2 RRR  Abd soft  Diaper  LE no edema  Moves all  Skin w/o rash  Oriented to self  Mildly tremulous        Data    I have personally reviewed data, flowsheets for the last 24 hours.         Labs:  Recent Labs      10/08/18   1834   WBC  9.0   HGB  14.8   HCT  42.4   PLT  339     Recent Labs      10/08/18   1834   NA  136   K  4.0   CL  104   CO2  26   GLU  104*   BUN  16   CREA  1.32*   CA  8.8   ALB  3.7   SGOT  770*   ALT  442*   INR  1.0       Dawson Beth MD  [facility]

## 2018-10-09 NOTE — PROCEDURES
ELECTROENCEPHALOGRAM REPORT    HISTORY: The patient is a 23-year-old male who is being evaluated for  seizure activity  DESCRIPTION: This is an 18-channel EEG performed on an awake patient. The predominant posterior background rhythm consists of low voltage rhythms  in the 12-13 Hz frequency range out of the posterior head region. No clear drowsiness or sleep architecture was seen. Photic stimulation did not elicit a significant driving response. ELECTROENCEPHALOGRAM SUMMARY: Abnormal electroencephalogram due to generalized fast activity throughout the recording. No EEG seizures or epileptiform activity identified. CLINICAL INTERPRETATION: This electroencephalogram shows generalized fast background activity throughout the recording. This may be due to benzodiazepine administration. No epileptiform activity or EEG seizures were recorded.       Vero Sabillon DO  10/09/18

## 2018-10-09 NOTE — PROGRESS NOTES
Hospitalist Progress Note Irene Snell MD 
Answering service: [phone] OR [phone] from in house phone Date of Service:  10/9/2018 NAME:  Meera Booker :  1940 MRN:  [id number] Admission Summary: An 47-year-old white male with past medical history of GERD, hypertension, status post cholecystectomy, presented to the emergency department from home initially with chief complaint of epigastric abdominal pain. According to the ER reports, the patient had epigastric abdominal pain that started approximately a week ago, that had remained constant, rated as a 5/10, moderate severity, without specific exacerbating or alleviating factors despite the use of Pepto-Bismol. He reportedly drinks alcohol heavily and according to his girlfriend's report his last drink was approximately 2 days ago. On arrival to the emergency department, initial recorded vital signs, blood pressure 153/89, heart rate 103, respiration rate 16, O2 saturation 98% on room air Interval history / Subjective: Pt awake , calm on precedex Assessment & Plan: 1. Seizure -- new onset. Suspect possible alcohol withdrawal seizures. - banana bag , precedex  
- EEG may need to be on keppra later  
- cont thiamine, folate, multivitamin therapies. 2. Metabolic encephalopathy  likely secondary delirium with noted alcohol withdrawal.   
- CT head. Findings were age indeterminate microvascular ischemic disease. Patient has no focal neurological deficits on current examination to suggest stroke. 3.  Systemic inflammatory response syndrome, now with fever and tachycardia. - UA/ CXR negative - Low grade watch for now 4. Abdominal pain epigastric.   
- CT abdomen was unremarkable for any acute process to explain the same. Patient has constipation with noted fecal retention noted on CT report. - lipase - and ammonia - wnl 7.  Tachycardia with sinus arrhythmia. Again, concern for alcohol withdrawal DTs. Placed on telemetry monitoring. 8.  Hypertension /agitation. Provide hypertensive meds as needed. 9.  Agitation with anxiety. Plan as noted above. 10.  Elevated liver function tests with hyperbilirubinemia. 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 Chronic kidney disease 3, elevated creatinine -- noted. Continue with IV fluid hydration resuscitation. 12.  Gastroesophageal reflux disease. Continue Protonix. . 
  
  
Code status: Full DVT prophylaxis: SCD Care Plan discussed with: Patient/Family and Nurse Disposition: TBD The patient's female  Candelaria Win notes that the patient's son's telephone [phone] whom she is notified regarding the patient's current hospitalization. D/w family at bedside Hospital Problems  Date Reviewed: 10/8/2018 Codes Class Noted POA Seizure ([facility].) ICD-10-CM: R56.9 ICD-9-CM: 780.39  10/8/2018 Unknown Altered mental status ICD-10-CM: R41.82 
ICD-9-CM: 780.97  10/8/2018 Unknown * (Principal)Stroke ([facility] ICD-10-CM: I63.9 ICD-9-CM: 434.91  10/8/2018 Unknown Elevated LFTs ICD-10-CM: R94.5 ICD-9-CM: 790.6  10/8/2018 Unknown Review of Systems:  
Review of systems not obtained due to patient factors. Vital Signs:  
 Last 24hrs VS reviewed since prior progress note. Most recent are: 
Visit Vitals  /81 (BP 1 Location: Left arm, BP Patient Position: At rest)  Pulse 74  Temp 99.6 °F (37.6 °C)  Resp 19  
 Ht 5' 8\" (1.727 m)  Wt 75.9 kg (167 lb 5.3 oz)  SpO2 100%  BMI 25.44 kg/m2 Intake/Output Summary (Last 24 hours) at 10/09/18 1027 Last data filed at 10/09/18 0800 Gross per 24 hour Intake          2940.14 ml Output              440 ml Net          2500.14 ml Physical Examination:  
 
 
     
Constitutional:  No acute distress, ENT:  Oral mucous moist, Resp:  CTA bilaterally. CV:  Regular rhythm, normal rate, GI:  Soft, non distended, non tender. bs= Musculoskeletal:  No edema, warm, 2+ pulses throughout Neurologic:  Moves all extremities. Awake on precedex , Psych:  , Not anxious nor agitated. Data Review:  
 I personally reviewed  Image and labs Ct Head Wo Cont Result Date: 10/8/2018 IMPRESSION: Age indeterminate microvascular ischemic disease. No intracranial hemorrhage. Xr Chest [facility] Result Date: 10/8/2018 IMPRESSION: No acute process identified Cta Abdomen Pelv W Cont Result Date: 10/8/2018 IMPRESSION: 1. No acute process on CT. 2. 5 mm left lower lobe nodule is increased since last year. Recommend reevaluation with noncontrast CT chest in 6-12 months. 3. Unchanged 2.8 cm infrarenal abdominal aortic ectasia. No aneurysm or dissection. 4. Normal pancreas. 5. Mild colonic fecal retention. Labs:  
 
Recent Labs 10/09/18 
 5309  10/08/18 
 1834 WBC  7.9  9.0 HGB  13.0  14.8 HCT  36.7  42.4 PLT  238  339 Recent Labs 10/09/18 
 4475  10/08/18 
 1834 NA  142  136  
K  4.0  4.0  
CL  112*  104 CO2  25  26 BUN  12  16 CREA  1.23  1.32* GLU  75  104* CA  7.7*  8.8 Recent Labs 10/09/18 
 0253  10/08/18 
 1834 SGOT  628*  770* ALT  602*  442* AP  224*  301* TBILI  2.8*  1.6* TP  6.2*  7.7 ALB  2.9*  3.7 GLOB  3.3  4.0  
LPSE   --   253 Recent Labs 10/08/18 
 1834 INR  1.0 PTP  9.8 APTT  25.1 No results for input(s): FE, TIBC, PSAT, FERR in the last 72 hours. No results found for: FOL, RBCF No results for input(s): PH, PCO2, PO2 in the last 72 hours. Recent Labs 10/08/18 
 1834 TROIQ  <0.05 No results found for: CHOL, CHOLX, CHLST, CHOLV, HDL, LDL, LDLC, DLDLP, TGLX, TRIGL, TRIGP, CHHD, CHHDX No results found for: Sydnie Roblero Lab Results Component Value Date/Time  Color YELLOW/STRAW 10/09/2018 02:31 AM  
 Appearance CLEAR 10/09/2018 02:31 AM  
 Specific gravity 1.005 10/09/2018 02:31 AM  
 pH (UA) 7.5 10/09/2018 02:31 AM  
 Protein NEGATIVE  10/09/2018 02:31 AM  
 Glucose NEGATIVE  10/09/2018 02:31 AM  
 Ketone NEGATIVE  10/09/2018 02:31 AM  
 Bilirubin NEGATIVE  10/09/2018 02:31 AM  
 Urobilinogen 1.0 10/09/2018 02:31 AM  
 Nitrites NEGATIVE  10/09/2018 02:31 AM  
 Leukocyte Esterase NEGATIVE  10/09/2018 02:31 AM  
 
 
 
Medications Reviewed:  
 
Current Facility-Administered Medications Medication Dose Route Frequency  sodium chloride (NS) flush 5-10 mL  5-10 mL IntraVENous Q8H  
 sodium chloride (NS) flush 5-10 mL  5-10 mL IntraVENous PRN  
 dexmedeTOMidine (PRECEDEX) 400 mcg in 0.9% sodium chloride 100 mL infusion  0.2-1.4 mcg/kg/hr IntraVENous TITRATE  albuterol-ipratropium (DUO-NEB) 2.5 MG-0.5 MG/3 ML  3 mL Nebulization Q6H PRN  pantoprazole (PROTONIX) 40 mg in sodium chloride 0.9% 10 mL injection  40 mg IntraVENous Q12H  
 dextrose 5% and 0.9% NaCl infusion  50 mL/hr IntraVENous CONTINUOUS  
 LORazepam (ATIVAN) injection 1 mg  1 mg IntraVENous Q2H PRN  
 LORazepam (ATIVAN) injection 2 mg  2 mg IntraVENous Q2H PRN  
 0.9% sodium chloride 1,000 mL with mvi, adult no. 4 with vit K 10 mL, thiamine 844 mg, folic acid 1 mg infusion   IntraVENous Q24H  
 sodium chloride (NS) flush 5-10 mL  5-10 mL IntraVENous PRN  piperacillin-tazobactam (ZOSYN) 3.375 g in 0.9% sodium chloride (MBP/ADV) 100 mL  3.375 g IntraVENous Q8H  
 levoFLOXacin (LEVAQUIN) 750 mg in D5W IVPB  750 mg IntraVENous Q48H  
 
______________________________________________________________________ EXPECTED LENGTH OF STAY: - - - 
ACTUAL LENGTH OF STAY:          1 Michael Aparicio MD

## 2018-10-09 NOTE — CDMP QUERY
There is noted documentation in the H & P of the patient having Chronic kidney disease, elevated creatinine. ..after further review can this be further clarified as:    
 
=> Chronic kidney disease, stage 3 (moderate) in the setting of GFR ranging from 52-57 since admission with Cr. 1.23-1.32 requiring IV hydration and lab monitoring 
=> Other explanation of clinical findings 
=> Clinically Undetermined (no explanation for clinical findings) The medical record reflects the following Risk Factors:  HTN Clinical Indicators:  GFR ranging from 52-57 since admission with Cr. 1.23-1.32 w/documentation of chronic kidney disease in H & P. Treatment: IV hydration and lab monitoring Reference criteria: 
 
Stage I: GFR > 90 Stage II: GFR 60-89 Stage III: GFR 30-59 Stage IV: GFR 15-29 Stage V: GFR < 15 Please clarify and document your clinical opinion in the progress notes and discharge summary including the definitive and/or presumptive diagnosis, (suspected or probable), related to the above clinical findings. Please include clinical findings supporting your diagnosis. Thank you, Camilo Stein RN, BSN, [facility], [address] [facility] [place] 
[phone]

## 2018-10-09 NOTE — PROGRESS NOTES
Bedside and Verbal shift change report given to Meño RN (oncoming nurse) by Riky Harmon  (offgoing nurse). Report included the following information SBAR.  
 
0800: Pt alert, calm, confused, VSS, on precedex @ 0.1mcg/kg/hr. 0830: Family/friend updated on phone. 0840: EEG at bedside. 1020: BG 79- Dr. Casi High paged- orders received. 1100: Family updated at bedside. 1200: Reassessment, no changes. 1600: Pt A&OX4, follows commands, moves extremities purposefully. VSS.  
 
1645: Precedex turned off. Bedside and Verbal shift change report given to Riky Harmon (oncoming nurse) by Sherren Shores RN (offgoing nurse). Report included the following information SBAR.

## 2018-10-09 NOTE — PROGRESS NOTES
Admission Medication Reconciliation: 
 
Information obtained from:  patient's friend, Gi Benitez Comments/Recommendations:  
 
Patient was disoriented during my interview so I spoke with the patient's friend regarding patient's medications. She states that he \"takes something for blood pressure and Prilosec, and takes Tums and Pepto-Bismol like they are going out of style. \" Doses of medications could not be verified, but per RxQuery, the \"blood pressure\" medication is likely lisinopril 40 mg daily (last filled 8/3 for 90-day supply) The following changes were made to the PTA medication list: 1. Removed ondansetron and oxycodone/APAP, as there was no fill history in RxQuery 2. Added Tums and Pepto-Bismol Attempted to verify allergies and reactions with the patient's friend. [facility] [address] [facility] Allergies:  Review of patient's allergies indicates no known allergies. Chief Complaint for this Admission: Chief Complaint Patient presents with  Epigastric Pain Significant PMH/Disease States:  
Past Medical History:  
Diagnosis Date  GERD (gastroesophageal reflux disease)  Hypertension Prior to Admission Medications:  
Prior to Admission Medications Prescriptions Last Dose Informant Patient Reported? Taking?  
bismuth subsalicylate (PEPTO-BISMOL PO)   Yes Yes Sig: Take  by mouth.  
calcium carbonate (TUMS) 200 mg calcium (500 mg) chew   Yes Yes Sig: Take 1 Tab by mouth as needed. lisinopril (PRINIVIL, ZESTRIL) 40 mg tablet   Yes No  
Sig: Take 40 mg by mouth daily. omeprazole (PRILOSEC) 10 mg capsule   Yes No  
Sig: Take 10 mg by mouth daily. Indications: PREVENTION OF NSAID-INDUCED GASTRIC ULCER Facility-Administered Medications: None Thank you for allowing me to participate in this patient's care. Please call the main pharmacy at  or the [facility] pharmacist at  with any questions. Roel Rabago, PharmD

## 2018-10-09 NOTE — PROGRESS NOTES
Problem: Falls - Risk of 
Goal: *Absence of Falls Document Shannan Resendez Fall Risk and appropriate interventions in the flowsheet. Outcome: Progressing Towards Goal 
Fall Risk Interventions: 
Mobility Interventions: Communicate number of staff needed for ambulation/transfer, Assess mobility with egress test 
 
Mentation Interventions: Adequate sleep, hydration, pain control, Evaluate medications/consider consulting pharmacy Medication Interventions: Evaluate medications/consider consulting pharmacy Elimination Interventions: Toileting schedule/hourly rounds Problem: Pressure Injury - Risk of 
Goal: *Prevention of pressure injury Document El Scale and appropriate interventions in the flowsheet. Outcome: Progressing Towards Goal 
Pressure Injury Interventions: 
Sensory Interventions: Assess need for specialty bed, Assess changes in LOC Moisture Interventions: Apply protective barrier, creams and emollients, Absorbent underpads Activity Interventions: Assess need for specialty bed, Increase time out of bed Mobility Interventions: Assess need for specialty bed Nutrition Interventions: Document food/fluid/supplement intake, Discuss nutritional consult with provider Friction and Shear Interventions: Apply protective barrier, creams and emollients, Feet elevated on foot rest

## 2018-10-09 NOTE — PROGRESS NOTES
Reason for Admission:   Chest pain, Had a sz in the ED (no history) Patient admitted to ICU with ETOH withdrawal, CIWA 18 
                
RRAT Score:    10 Plan for utilizing home health:     TBD Likelihood of Readmission:  moderate Transition of Care Plan:    TBD Care manager met with patient's son and his Rl De Santiagope (friend) to explain role and discuss transitions of care. Patient lives in his own home which is on one level with 3-4 steps to enter. He has no previous home health or equipment needs. Patient's PCP is Dr Molly Ceballos in [place] and he has not seen him in over a year, uses the local [facility] as his pharmacy. Patient drives himself to his appointments. Care management will follow for potential discharge needs. Bart Gaxiola RN,CRM Care Management Interventions PCP Verified by CM: Yes (Dr Molly Ceballos ) Palliative Care Criteria Met (RRAT>21 & CHF Dx)?: No 
MyChart Signup: No 
Discharge Durable Medical Equipment: No 
Physical Therapy Consult: No 
Occupational Therapy Consult: No 
Speech Therapy Consult: No 
Current Support Network: Own Home, Lives Alone Santos Florinda Davis [phone]) Confirm Follow Up Transport: Friends

## 2018-10-09 NOTE — H&P
[address]  HISTORY AND PHYSICAL Zuleika Reas 
MR#: [id number] : 1940 ACCOUNT #: [de-identified] ADMIT DATE: 10/08/2018 CHIEF COMPLAINT:  The patient does provide. HISTORY OF PRESENT ILLNESS:  An 49-year-old white male with past medical history of GERD, hypertension, status post cholecystectomy, presented to the emergency department from home initially with chief complaint of epigastric abdominal pain. According to the ER reports, the patient had epigastric abdominal pain that started approximately a week ago, that had remained constant, rated as a 5/10, moderate severity, without specific exacerbating or alleviating factors despite the use of Pepto-Bismol. He reportedly drinks alcohol heavily and according to his girlfriend's report his last drink was approximately 2 days ago. On arrival to the emergency department, initial recorded vital signs, blood pressure 153/89, heart rate 103, respiration rate 16, O2 saturation 98% on room air. According to the ER reports, ED MD doctor notes that at 6:58 p.m. during exam, the patient became unresponsive, started having tonic-clonic generalized seizure activity lasting approximately 30 seconds with pulse present during the whole seizure, no cyanosis and patient reportedly regained consciousness. The patient reportedly remained confused after the event. After which the patient reportedly had improvement of abdominal pain. Workup included a CT of the head without contrast, which showed age indeterminate microvascular ischemic disease, greatest in bilateral frontal lobes with no intracranial hemorrhage. CTA of the abdomen and pelvis with IV contrast showed no acute process with incidental note of a possible hepatic steatosis, mild colonic fecal retention unchanged 2.8 cm infrarenal abdominal aortic ectasia with no aneurysm and a 5 mm left lower lobe nodule increased in size since last exam 1 year ago.   A 12-lead  EKG shows sinus rhythm with marked sinus arrhythmia, left axis deviation, nonspecific ST changes 86 beats per minute. Patient's labs showed elevated LFTs AST of 720, , total bilirubin 1.6. Her creatinine is equal 1.32 (compared to last creatinine 1.51 on 03/27/2017). Per the ED, the patient was given 0.9% normal saline 1000 mL fluid bolus x1, Ativan 1 mg IV plus addition of 2 mg IV and Protonix 40 mg IV. Patient is now seen for admission to the hospitalist service for continued evaluation and treatments. Prior to arrival at the bedside, the ED had already applied soft wrist restraints due to patient's extreme agitation and combative agitation, trying to remove devices and mobilize out of bed against advice. On current bedside evaluation,  the patient remains confused, not answering questions appropriately. His girlfriend noticed that the patient lives in his own property and normally performs activities of daily living unassisted. There have been no reports of slurred speech, facial droop, focal weakness. Recent complaints of visual disturbance, chest pain, shortness of breath, palpitations, nausea, vomiting, diarrhea, melena, dysuria, hematuria, calf pain, swelling, edema, fever, chills, rash. PAST MEDICAL HISTORY:  GERD and hypertension. PAST SURGICAL HISTORY: 
1. Colonoscopy. 2.  Cholecystectomy. MEDICATIONS:  The medication list reviewed and noted on chart records. Taking Last Dose Start Date End Date Provider    
   bismuth subsalicylate (PEPTO-BISMOL PO)    --  --  Historical Provider  
   Take  by mouth.  
   calcium carbonate (TUMS) 200 mg calcium (500 mg) chew    --  --  Historical Provider  
   Take 1 Tab by mouth as needed.  
   lisinopril (PRINIVIL, ZESTRIL) 40 mg tablet    --  --  Seth Brewer MD  
   Take 40 mg by mouth daily.  
   omeprazole (PRILOSEC) 10 mg capsule    --  --  Historical Provider  
   Take 10 mg by mouth daily.  Indications: PREVENTION OF NSAID-INDUCED GASTRIC ULCER  
   
 
 
ALLERGIES:  NO KNOWN DRUG ALLERGIES. SOCIAL HISTORY:  Former smoker of cigarettes, reportedly quit in 1966. Positive for occasional alcohol. FAMILY HISTORY:  Unknown in regards of heart attacks or strokes. REVIEW OF SYSTEMS:  Unable to obtain  review of systems. The patient answers questions appropriately. PHYSICAL EXAMINATION: 
VITAL SIGNS:  Temperature 100.0 degrees Fahrenheit, blood pressure 139/103, heart rate 127, respiratory rate 29, O2 saturation 100% on room air. Recorded weight 164 pounds (74.7 kg)  recorded weight height of 5 feet 8 inches tall. GENERAL:  Elderly male in slight tachypneic with noted agitation. PSYCHIATRIC:  Patient is confused, oriented x1 to name only very agitated, trying to pull off devices and uncooperative. NEUROLOGIC:  GCS of 13  (E4 V3 M6) spontaneous eye opening, confused speech, only follows some but not most commands. Moves extremities x4. Sensation grossly intact. No slurred speech, no facial droop. Unable to test pronator drift. Patient is not cooperative. HEENT:  Normocephalic, atraumatic. Pupils are 2 mm reactive. Sclerae are anicteric. Conjunctivae dry grossly. Nares are patent. Oropharynx clear. Tongue is midline, nonedematous. NECK:  Supple, without lymphadenopathy, JVD, carotid bruits or thyromegaly. LYMPH:  Negative for cervical or supraclavicular adenopathy. RESPIRATORY:  Lungs  clear to auscultation bilaterally. CARDIOVASCULAR:  Heart tachycardic, regular rhythm. No murmurs, rubs or gallops. ABDOMEN:  Soft, nontender, nondistended, normoactive bowel sounds. No rebound, guarding or rigidity. No auscultated bruits. No abdominal pulsatile mass. BACK:  No CVA tenderness. No step-off deformity. MUSCULOSKELETAL:  No acute palpable bony deformity. Negative for calf tenderness. VASCULAR:  2+ radial, 1+ dorsalis pedis pulse without cyanosis, clubbing, edema. SKIN:  Warm and dry. LABORATORY DATA:  Reviewed. Sodium 136, potassium 4.0, chloride 104, CO2 of 26, BUN of 16, creatinine 1.32, glucose 104, anion gap is 6, calcium is 8.8, GFR of 52, total bilirubin was 1.6, total protein 7.7, albumin 3.7, , , alkaline phosphatase 301, lipase 253, troponin I less than 0.05. Ammonia 16. WBC is 9.0, hemoglobin 14.8, hematocrit of 42.4, platelets 486, neutrophils 71%. INR 1.0, PT of 9.8, PTT 25.1. CT of the head without contrast age undetermined microvascular ischemic disease. No intracranial hemorrhage. CTA of the abdomen and pelvis with IV contrast was also reviewed and noted in HPI. A 12-lead EKG was also noted. ASSESSMENT AND PLAN: 
1. Seizure -- new onset. Suspect possible alcohol withdrawal seizures. Consider for DTs. At this time admit patient to the ICU, is requiring constant supervision by nursing  staff trying to mobilize out of bed and monitoring devices. I have placed orders for [facility] alcohol withdrawal protocol with Ativan p.r.n. We will order a banana bag therapy, multivitamin, thiamine and folate daily therapy. Place on seizure precautions, order EEG and consult with neurologist. 
2.  Altered mental status likely secondary delirium with noted alcohol withdrawal.  We will also consider for encephalopathy if the patient is now febrile. Now considering for possibility of systemic inflammatory response syndrome diagnosis. called and spoke with a neurologist in tele neurologist on call in regards to the patient's CT head. Findings were age indeterminate microvascular ischemic disease. Patient has no focal neurological deficits on current examination to suggest stroke. Per discussion with tele neurologist he agrees with plan of care as mentioned to include alcohol withdrawal protocol and coverage with thiamine, folate, multivitamin therapies. Also order acetaminophen, salicylate. Order alcohol blood level. Place on add on test with the same.   Placed on neurovascular checks and fall precautions. 3.  Systemic inflammatory response syndrome, now with fever and tachycardia. We will order a pair of blood cultures. UA with microscopy. Chest x-ray portable IV fluids to equate the 30 mL/kg IV fluid bolus per sepsis protocol. Monitor closely. Provide empiric IV antibiotics with Zosyn and Levaquin. 4.  Abdominal pain hyperepigastric. CT abdomen was unremarkable for any acute process to explain the same. Patient has constipation with noted fecal retention noted on CT report. Consider for bowel regimen. Otherwise consider for GI consultation as the patient has been followed in the past by Dr. Danny Demarco. 7.  Tachycardia with sinus arrhythmia. Again, concern for alcohol withdrawal DTs. Placed on telemetry monitoring. 8.  Hypertension noted agitation. Provide hypertensive meds as needed. 9.  Agitation with anxiety. Plan as noted above. 10.  Elevated liver function tests with hyperbilirubinemia. Repeat comprehensive metabolic panel. He has evidence of hepatic steatosis on CT report with smooth liver. No nodularity noted to suggest cirrhosis. Continue with plan of care as noted above, consider for abdominal ultrasound. Consult GI service in a.m. 11.  Chronic kidney disease, elevated creatinine -- noted. Continue with IV fluid hydration resuscitation. 12.  Gastroesophageal reflux disease. Continue Protonix. 13.  Venous thromboembolism prophylaxis. Sequential compression devices to lower extremities. The patient's female  Refugio Evans notes that the patient's son's telephone [phone] whom she is notified regarding the patient's current hospitalization. MD AKASH Fernandez/MIKE 
D: 10/08/2018 22:20    
T: 10/08/2018 23:11 
JOB #: [id number]

## 2018-10-09 NOTE — PROGRESS NOTES
Day #1 of Levaquin Indication:  Sepsis Current regimen:  750 mg IV q24h Abx regimen: Zosyn + Levaquin Recent Labs 10/08/18 
 1834 WBC  9.0  
CREA  1.32* BUN  16 Est CrCl: 43 ml/min; UO: -- ml/kg/hr Temp (24hrs), Av.8 °F (37.1 °C), Min:98 °F (36.7 °C), Max:100 °F (37.8 °C) Cultures: none Plan: Change to 750 mg q48h for CrCl < 50 ml/min

## 2018-10-10 LAB
ANION GAP SERPL CALC-SCNC: 11 MMOL/L (ref 5–15)
BUN SERPL-MCNC: 14 MG/DL (ref 6–20)
BUN/CREAT SERPL: 11 (ref 12–20)
CALCIUM SERPL-MCNC: 7.9 MG/DL (ref 8.5–10.1)
CHLORIDE SERPL-SCNC: 109 MMOL/L (ref 97–108)
CO2 SERPL-SCNC: 21 MMOL/L (ref 21–32)
CREAT SERPL-MCNC: 1.29 MG/DL (ref 0.7–1.3)
ERYTHROCYTE [DISTWIDTH] IN BLOOD BY AUTOMATED COUNT: 12.6 % (ref 11.5–14.5)
GLUCOSE BLD STRIP.AUTO-MCNC: 194 MG/DL (ref 65–100)
GLUCOSE BLD STRIP.AUTO-MCNC: 76 MG/DL (ref 65–100)
GLUCOSE SERPL-MCNC: 69 MG/DL (ref 65–100)
HCT VFR BLD AUTO: 35.2 % (ref 36.6–50.3)
HGB BLD-MCNC: 12.4 G/DL (ref 12.1–17)
MCH RBC QN AUTO: 32.6 PG (ref 26–34)
MCHC RBC AUTO-ENTMCNC: 35.2 G/DL (ref 30–36.5)
MCV RBC AUTO: 92.6 FL (ref 80–99)
NRBC # BLD: 0 K/UL (ref 0–0.01)
NRBC BLD-RTO: 0 PER 100 WBC
PLATELET # BLD AUTO: 241 K/UL (ref 150–400)
PMV BLD AUTO: 9.3 FL (ref 8.9–12.9)
POTASSIUM SERPL-SCNC: 3.7 MMOL/L (ref 3.5–5.1)
RBC # BLD AUTO: 3.8 M/UL (ref 4.1–5.7)
SERVICE CMNT-IMP: ABNORMAL
SERVICE CMNT-IMP: NORMAL
SODIUM SERPL-SCNC: 141 MMOL/L (ref 136–145)
WBC # BLD AUTO: 6.3 K/UL (ref 4.1–11.1)

## 2018-10-10 PROCEDURE — 74011000250 HC RX REV CODE- 250: Performed by: INTERNAL MEDICINE

## 2018-10-10 PROCEDURE — 36415 COLL VENOUS BLD VENIPUNCTURE: CPT | Performed by: INTERNAL MEDICINE

## 2018-10-10 PROCEDURE — 74011000250 HC RX REV CODE- 250: Performed by: FAMILY MEDICINE

## 2018-10-10 PROCEDURE — 65660000001 HC RM ICU INTERMED STEPDOWN

## 2018-10-10 PROCEDURE — 74011000258 HC RX REV CODE- 258: Performed by: FAMILY MEDICINE

## 2018-10-10 PROCEDURE — C9113 INJ PANTOPRAZOLE SODIUM, VIA: HCPCS | Performed by: INTERNAL MEDICINE

## 2018-10-10 PROCEDURE — 82962 GLUCOSE BLOOD TEST: CPT

## 2018-10-10 PROCEDURE — 85027 COMPLETE CBC AUTOMATED: CPT | Performed by: INTERNAL MEDICINE

## 2018-10-10 PROCEDURE — 74011000250 HC RX REV CODE- 250: Performed by: HOSPITALIST

## 2018-10-10 PROCEDURE — 80048 BASIC METABOLIC PNL TOTAL CA: CPT | Performed by: INTERNAL MEDICINE

## 2018-10-10 PROCEDURE — 74011250636 HC RX REV CODE- 250/636: Performed by: INTERNAL MEDICINE

## 2018-10-10 PROCEDURE — 74011250636 HC RX REV CODE- 250/636: Performed by: FAMILY MEDICINE

## 2018-10-10 RX ORDER — LORAZEPAM 2 MG/ML
2 INJECTION INTRAMUSCULAR
Status: DISCONTINUED | OUTPATIENT
Start: 2018-10-10 | End: 2018-10-11 | Stop reason: HOSPADM

## 2018-10-10 RX ORDER — LORAZEPAM 2 MG/ML
1 INJECTION INTRAMUSCULAR
Status: DISCONTINUED | OUTPATIENT
Start: 2018-10-10 | End: 2018-10-11 | Stop reason: HOSPADM

## 2018-10-10 RX ORDER — DEXTROSE 50 % IN WATER (D50W) INTRAVENOUS SYRINGE
12.5 ONCE
Status: COMPLETED | OUTPATIENT
Start: 2018-10-10 | End: 2018-10-10

## 2018-10-10 RX ADMIN — HEPARIN SODIUM 5000 UNITS: 5000 INJECTION INTRAVENOUS; SUBCUTANEOUS at 10:36

## 2018-10-10 RX ADMIN — Medication 10 ML: at 22:32

## 2018-10-10 RX ADMIN — FOLIC ACID: 5 INJECTION, SOLUTION INTRAMUSCULAR; INTRAVENOUS; SUBCUTANEOUS at 22:38

## 2018-10-10 RX ADMIN — PIPERACILLIN SODIUM,TAZOBACTAM SODIUM 3.38 G: 3; .375 INJECTION, POWDER, FOR SOLUTION INTRAVENOUS at 22:32

## 2018-10-10 RX ADMIN — SODIUM CHLORIDE 40 MG: 9 INJECTION INTRAMUSCULAR; INTRAVENOUS; SUBCUTANEOUS at 08:00

## 2018-10-10 RX ADMIN — HEPARIN SODIUM 5000 UNITS: 5000 INJECTION INTRAVENOUS; SUBCUTANEOUS at 19:00

## 2018-10-10 RX ADMIN — Medication 10 ML: at 15:15

## 2018-10-10 RX ADMIN — DEXTROSE MONOHYDRATE 12.5 G: 25 INJECTION, SOLUTION INTRAVENOUS at 09:23

## 2018-10-10 RX ADMIN — LEVOFLOXACIN 750 MG: 5 INJECTION, SOLUTION INTRAVENOUS at 22:32

## 2018-10-10 RX ADMIN — HEPARIN SODIUM 5000 UNITS: 5000 INJECTION INTRAVENOUS; SUBCUTANEOUS at 02:59

## 2018-10-10 RX ADMIN — LORAZEPAM 2 MG: 2 INJECTION INTRAMUSCULAR; INTRAVENOUS at 06:31

## 2018-10-10 RX ADMIN — PIPERACILLIN SODIUM,TAZOBACTAM SODIUM 3.38 G: 3; .375 INJECTION, POWDER, FOR SOLUTION INTRAVENOUS at 05:20

## 2018-10-10 RX ADMIN — Medication 10 ML: at 05:20

## 2018-10-10 RX ADMIN — SODIUM CHLORIDE 40 MG: 9 INJECTION INTRAMUSCULAR; INTRAVENOUS; SUBCUTANEOUS at 20:36

## 2018-10-10 RX ADMIN — PIPERACILLIN SODIUM,TAZOBACTAM SODIUM 3.38 G: 3; .375 INJECTION, POWDER, FOR SOLUTION INTRAVENOUS at 15:15

## 2018-10-10 NOTE — PROGRESS NOTES
Speech Path Consult received and appreciated. Chart reviewed. Discussed with RN who reports plan to cancel order as patient passed dysphagia screen and MD OK with diet initiation/order cancellation. No further speech concerns raised by RN. Please reconsult as needed. Myrtle Lowery MS, CCC-SLP, BCS-S

## 2018-10-10 NOTE — PROGRESS NOTES
Bedside and Verbal shift change report given to Meño RN (oncoming nurse) by Tremayne Pulliam RN (offgoing nurse). Report included the following information SBAR. 
 
0800: Pt A&OX4, moves extremities purposefully, pt cooperating, has a purewick. 0815: Dr. Stephen Wick paged for BG 76. Orders received. BG now 194 
 
1200: Reassessment, no changes. Pt passed stand- eating lunch. Family at bedside. MD updated son. Plan for pt to discharge tomorrow. Bedside and Verbal shift change report given to Sean Veliz (oncoming nurse) by Joie Gitelman (offgoing nurse). Report included the following information SBAR, Kardex, ED Summary, Intake/Output, MAR, Accordion, Recent Results, Med Rec Status and Cardiac Rhythm NSR.

## 2018-10-10 NOTE — PROGRESS NOTES
Hospitalist Progress Note Lilia Pedraza MD 
Answering service: [phone] OR [phone] from in house phone Date of Service:  10/10/2018 NAME:  Joanne Grigsby :  1940 MRN:  [id number] Admission Summary: An 49-year-old white male with past medical history of GERD, hypertension, status post cholecystectomy, presented to the emergency department from home initially with chief complaint of epigastric abdominal pain. According to the ER reports, the patient had epigastric abdominal pain that started approximately a week ago, that had remained constant, rated as a 5/10, moderate severity, without specific exacerbating or alleviating factors despite the use of Pepto-Bismol. He reportedly drinks alcohol heavily and according to his girlfriend's report his last drink was approximately 2 days ago. On arrival to the emergency department, initial recorded vital signs, blood pressure 153/89, heart rate 103, respiration rate 16, O2 saturation 98% on room air Interval history / Subjective: Pt awake , calm off precedex Assessment & Plan: 1. Seizure -- new onset. Suspect possible alcohol withdrawal seizures. - banana bag , CIWA 
- EEG may need to be on keppra later  
- cont thiamine, folate, multivitamin therapies. 2. Metabolic encephalopathy  likely secondary delirium with noted alcohol withdrawal.   
- CT head. Findings were age indeterminate microvascular ischemic disease. Patient has no focal neurological deficits on current examination to suggest stroke. 3.  Systemic inflammatory response syndrome, now with fever and tachycardia. - UA/ CXR negative - Low grade watch for now 4. Abdominal pain epigastric.   
- CT abdomen was unremarkable for any acute process to explain the same. Patient has constipation with noted fecal retention noted on CT report. - lipase - and ammonia - wnl 7.  Tachycardia with sinus arrhythmia.  concern for alcohol withdrawal DTs. Placed on telemetry monitoring. 8.  Hypertension /agitation. Provide hypertensive meds as needed. 9.  Agitation with anxiety. Plan as noted above. 10.  Elevated liver function tests with hyperbilirubinemia. 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 Chronic kidney disease 3, elevated creatinine -- noted. Continue with IV fluid hydration resuscitation. 12.  Gastroesophageal reflux disease. Continue Protonix. . 
  
  
Code status: Full DVT prophylaxis: SCD Care Plan discussed with: Patient/Family and Nurse Disposition: TBD tx o IMCU The patient's female  Jeovanny Mention notes that the patient's son's telephone [phone] whom she is notified regarding the patient's current hospitalization. D/w family at bedside Hospital Problems  Date Reviewed: 10/8/2018 Codes Class Noted POA Seizure ([facility] ICD-10-CM: R56.9 ICD-9-CM: 780.39  10/8/2018 Unknown Altered mental status ICD-10-CM: R41.82 
ICD-9-CM: 780.97  10/8/2018 Unknown * (Principal)Stroke ([facility] ICD-10-CM: I63.9 ICD-9-CM: 434.91  10/8/2018 Unknown Elevated LFTs ICD-10-CM: R94.5 ICD-9-CM: 790.6  10/8/2018 Unknown Review of Systems:  
Review of systems not obtained due to patient factors. Vital Signs:  
 Last 24hrs VS reviewed since prior progress note. Most recent are: 
Visit Vitals  BP (!) 147/127  Pulse 93  Temp 98.4 °F (36.9 °C)  Resp 14  
 Ht 5' 8\" (1.727 m)  Wt 74.2 kg (163 lb 9.6 oz)  SpO2 98%  BMI 24.88 kg/m2 Intake/Output Summary (Last 24 hours) at 10/10/18 1147 Last data filed at 10/10/18 0800 Gross per 24 hour Intake          3021.72 ml Output              610 ml Net          2411.72 ml Physical Examination:  
 
 
     
Constitutional:  No acute distress, ENT:  Oral mucous moist, Resp:  CTA bilaterally.   
CV:  Regular rhythm, normal rate,   
 GI:  Soft, non distended, non tender. bs= Musculoskeletal:  No edema, warm, 2+ pulses throughout Neurologic:  Moves all extremities. Awake on precedex , Psych:  , Not anxious nor agitated. Data Review:  
 I personally reviewed  Image and labs Ct Head Wo Cont Result Date: 10/8/2018 IMPRESSION: Age indeterminate microvascular ischemic disease. No intracranial hemorrhage. Xr Chest [facility] Result Date: 10/8/2018 IMPRESSION: No acute process identified Cta Abdomen Pelv W Cont Result Date: 10/8/2018 IMPRESSION: 1. No acute process on CT. 2. 5 mm left lower lobe nodule is increased since last year. Recommend reevaluation with noncontrast CT chest in 6-12 months. 3. Unchanged 2.8 cm infrarenal abdominal aortic ectasia. No aneurysm or dissection. 4. Normal pancreas. 5. Mild colonic fecal retention. Labs:  
 
Recent Labs 10/10/18 
 4559  10/09/18 
 0852 WBC  6.3  7.9 HGB  12.4  13.0 HCT  35.2*  36.7 PLT  241  238 Recent Labs 10/10/18 
 9320  10/09/18 
 6749  10/08/18 
 1834 NA  141  142  136  
K  3.7  4.0  4.0  
CL  109*  112*  104 CO2  21  25  26 BUN  14  12  16 CREA  1.29  1.23  1.32* GLU  69  75  104* CA  7.9*  7.7*  8.8 Recent Labs 10/09/18 
 0876  10/08/18 
 1834 SGOT  628*  770* ALT  602*  442* AP  224*  301* TBILI  2.8*  1.6* TP  6.2*  7.7 ALB  2.9*  3.7 GLOB  3.3  4.0  
LPSE   --   253 Recent Labs 10/08/18 
 1834 INR  1.0 PTP  9.8 APTT  25.1 No results for input(s): FE, TIBC, PSAT, FERR in the last 72 hours. No results found for: FOL, RBCF No results for input(s): PH, PCO2, PO2 in the last 72 hours. Recent Labs 10/08/18 
 1834 TROIQ  <0.05 No results found for: CHOL, CHOLX, CHLST, CHOLV, HDL, LDL, LDLC, DLDLP, TGLX, TRIGL, TRIGP, CHHD, CHHDX Lab Results Component Value Date/Time  Glucose (POC) 194 (H) 10/10/2018 09:26 AM  
 Glucose (POC) 76 10/10/2018 08:17 AM  
 Glucose (POC) 79 10/09/2018 10:18 AM  
 
Lab Results Component Value Date/Time Color YELLOW/STRAW 10/09/2018 02:31 AM  
 Appearance CLEAR 10/09/2018 02:31 AM  
 Specific gravity 1.005 10/09/2018 02:31 AM  
 pH (UA) 7.5 10/09/2018 02:31 AM  
 Protein NEGATIVE  10/09/2018 02:31 AM  
 Glucose NEGATIVE  10/09/2018 02:31 AM  
 Ketone NEGATIVE  10/09/2018 02:31 AM  
 Bilirubin NEGATIVE  10/09/2018 02:31 AM  
 Urobilinogen 1.0 10/09/2018 02:31 AM  
 Nitrites NEGATIVE  10/09/2018 02:31 AM  
 Leukocyte Esterase NEGATIVE  10/09/2018 02:31 AM  
 
 
 
Medications Reviewed:  
 
Current Facility-Administered Medications Medication Dose Route Frequency  LORazepam (ATIVAN) injection 1 mg  1 mg IntraVENous Q1H PRN  
 LORazepam (ATIVAN) injection 2 mg  2 mg IntraVENous Q1H PRN  
 sodium chloride (NS) flush 5-10 mL  5-10 mL IntraVENous Q8H  
 sodium chloride (NS) flush 5-10 mL  5-10 mL IntraVENous PRN  
 albuterol-ipratropium (DUO-NEB) 2.5 MG-0.5 MG/3 ML  3 mL Nebulization Q6H PRN  pantoprazole (PROTONIX) 40 mg in sodium chloride 0.9% 10 mL injection  40 mg IntraVENous Q12H  
 dextrose 5% and 0.9% NaCl infusion  50 mL/hr IntraVENous CONTINUOUS  
 heparin (porcine) injection 5,000 Units  5,000 Units SubCUTAneous Q8H  
 influenza vaccine 2018-19 (6 mos+)(PF) (FLUARIX QUAD/FLULAVAL QUAD) injection 0.5 mL  0.5 mL IntraMUSCular PRIOR TO DISCHARGE  
 0.9% sodium chloride 1,000 mL with mvi, adult no. 4 with vit K 10 mL, thiamine 142 mg, folic acid 1 mg infusion   IntraVENous Q24H  
 sodium chloride (NS) flush 5-10 mL  5-10 mL IntraVENous PRN  piperacillin-tazobactam (ZOSYN) 3.375 g in 0.9% sodium chloride (MBP/ADV) 100 mL  3.375 g IntraVENous Q8H  
 levoFLOXacin (LEVAQUIN) 750 mg in D5W IVPB  750 mg IntraVENous Q48H  
 
______________________________________________________________________ EXPECTED LENGTH OF STAY: 4d 7h 
ACTUAL LENGTH OF STAY:          2 Jaime Betancur MD

## 2018-10-11 VITALS
WEIGHT: 177.47 LBS | RESPIRATION RATE: 19 BRPM | TEMPERATURE: 98.5 F | SYSTOLIC BLOOD PRESSURE: 117 MMHG | HEIGHT: 68 IN | DIASTOLIC BLOOD PRESSURE: 85 MMHG | HEART RATE: 90 BPM | OXYGEN SATURATION: 97 % | BODY MASS INDEX: 26.9 KG/M2

## 2018-10-11 PROBLEM — R79.89 ELEVATED LFTS: Status: RESOLVED | Noted: 2018-10-08 | Resolved: 2018-10-11

## 2018-10-11 PROBLEM — R41.82 ALTERED MENTAL STATUS: Status: RESOLVED | Noted: 2018-10-08 | Resolved: 2018-10-11

## 2018-10-11 PROBLEM — I63.9 STROKE (HCC): Status: RESOLVED | Noted: 2018-10-08 | Resolved: 2018-10-11

## 2018-10-11 PROBLEM — R56.9 SEIZURE (HCC): Status: RESOLVED | Noted: 2018-10-08 | Resolved: 2018-10-11

## 2018-10-11 LAB
ANION GAP SERPL CALC-SCNC: 11 MMOL/L (ref 5–15)
BASOPHILS # BLD: 0 K/UL (ref 0–0.1)
BASOPHILS NFR BLD: 1 % (ref 0–1)
BUN SERPL-MCNC: 9 MG/DL (ref 6–20)
BUN/CREAT SERPL: 9 (ref 12–20)
CALCIUM SERPL-MCNC: 7.3 MG/DL (ref 8.5–10.1)
CHLORIDE SERPL-SCNC: 112 MMOL/L (ref 97–108)
CO2 SERPL-SCNC: 19 MMOL/L (ref 21–32)
CREAT SERPL-MCNC: 1.05 MG/DL (ref 0.7–1.3)
DIFFERENTIAL METHOD BLD: ABNORMAL
EOSINOPHIL # BLD: 0.1 K/UL (ref 0–0.4)
EOSINOPHIL NFR BLD: 2 % (ref 0–7)
ERYTHROCYTE [DISTWIDTH] IN BLOOD BY AUTOMATED COUNT: 12.6 % (ref 11.5–14.5)
GLUCOSE BLD STRIP.AUTO-MCNC: 114 MG/DL (ref 65–100)
GLUCOSE SERPL-MCNC: 75 MG/DL (ref 65–100)
HCT VFR BLD AUTO: 34.6 % (ref 36.6–50.3)
HGB BLD-MCNC: 12.5 G/DL (ref 12.1–17)
IMM GRANULOCYTES # BLD: 0 K/UL (ref 0–0.04)
IMM GRANULOCYTES NFR BLD AUTO: 0 % (ref 0–0.5)
LYMPHOCYTES # BLD: 1.6 K/UL (ref 0.8–3.5)
LYMPHOCYTES NFR BLD: 28 % (ref 12–49)
MCH RBC QN AUTO: 32.6 PG (ref 26–34)
MCHC RBC AUTO-ENTMCNC: 36.1 G/DL (ref 30–36.5)
MCV RBC AUTO: 90.3 FL (ref 80–99)
MONOCYTES # BLD: 0.7 K/UL (ref 0–1)
MONOCYTES NFR BLD: 12 % (ref 5–13)
NEUTS SEG # BLD: 3.3 K/UL (ref 1.8–8)
NEUTS SEG NFR BLD: 57 % (ref 32–75)
NRBC # BLD: 0 K/UL (ref 0–0.01)
NRBC BLD-RTO: 0 PER 100 WBC
PLATELET # BLD AUTO: 258 K/UL (ref 150–400)
PMV BLD AUTO: 9.7 FL (ref 8.9–12.9)
POTASSIUM SERPL-SCNC: 3.5 MMOL/L (ref 3.5–5.1)
RBC # BLD AUTO: 3.83 M/UL (ref 4.1–5.7)
SERVICE CMNT-IMP: ABNORMAL
SODIUM SERPL-SCNC: 142 MMOL/L (ref 136–145)
WBC # BLD AUTO: 5.8 K/UL (ref 4.1–11.1)

## 2018-10-11 PROCEDURE — 97116 GAIT TRAINING THERAPY: CPT

## 2018-10-11 PROCEDURE — 90686 IIV4 VACC NO PRSV 0.5 ML IM: CPT | Performed by: HOSPITALIST

## 2018-10-11 PROCEDURE — 74011000250 HC RX REV CODE- 250: Performed by: INTERNAL MEDICINE

## 2018-10-11 PROCEDURE — 97161 PT EVAL LOW COMPLEX 20 MIN: CPT

## 2018-10-11 PROCEDURE — 74011250636 HC RX REV CODE- 250/636: Performed by: HOSPITALIST

## 2018-10-11 PROCEDURE — 90471 IMMUNIZATION ADMIN: CPT

## 2018-10-11 PROCEDURE — 97530 THERAPEUTIC ACTIVITIES: CPT

## 2018-10-11 PROCEDURE — 74011250636 HC RX REV CODE- 250/636: Performed by: FAMILY MEDICINE

## 2018-10-11 PROCEDURE — 82962 GLUCOSE BLOOD TEST: CPT

## 2018-10-11 PROCEDURE — 85025 COMPLETE CBC W/AUTO DIFF WBC: CPT | Performed by: INTERNAL MEDICINE

## 2018-10-11 PROCEDURE — 74011000258 HC RX REV CODE- 258: Performed by: FAMILY MEDICINE

## 2018-10-11 PROCEDURE — 36415 COLL VENOUS BLD VENIPUNCTURE: CPT | Performed by: INTERNAL MEDICINE

## 2018-10-11 PROCEDURE — C9113 INJ PANTOPRAZOLE SODIUM, VIA: HCPCS | Performed by: INTERNAL MEDICINE

## 2018-10-11 PROCEDURE — 80048 BASIC METABOLIC PNL TOTAL CA: CPT | Performed by: INTERNAL MEDICINE

## 2018-10-11 PROCEDURE — 74011250636 HC RX REV CODE- 250/636: Performed by: INTERNAL MEDICINE

## 2018-10-11 RX ORDER — LEVOFLOXACIN 750 MG/1
750 TABLET ORAL EVERY 24 HOURS
Qty: 3 TAB | Refills: 0 | Status: SHIPPED | OUTPATIENT
Start: 2018-10-11 | End: 2018-10-14

## 2018-10-11 RX ORDER — CHLORDIAZEPOXIDE HYDROCHLORIDE 5 MG/1
5 CAPSULE, GELATIN COATED ORAL
Qty: 15 CAP | Refills: 0 | Status: SHIPPED | OUTPATIENT
Start: 2018-10-11 | End: 2018-10-16

## 2018-10-11 RX ORDER — LEVOFLOXACIN 250 MG/1
750 TABLET ORAL EVERY 24 HOURS
Status: DISCONTINUED | OUTPATIENT
Start: 2018-10-11 | End: 2018-10-11 | Stop reason: HOSPADM

## 2018-10-11 RX ADMIN — PIPERACILLIN SODIUM,TAZOBACTAM SODIUM 3.38 G: 3; .375 INJECTION, POWDER, FOR SOLUTION INTRAVENOUS at 05:59

## 2018-10-11 RX ADMIN — SODIUM CHLORIDE 40 MG: 9 INJECTION INTRAMUSCULAR; INTRAVENOUS; SUBCUTANEOUS at 11:23

## 2018-10-11 RX ADMIN — Medication 10 ML: at 05:16

## 2018-10-11 RX ADMIN — INFLUENZA VIRUS VACCINE 0.5 ML: 15; 15; 15; 15 SUSPENSION INTRAMUSCULAR at 11:33

## 2018-10-11 RX ADMIN — HEPARIN SODIUM 5000 UNITS: 5000 INJECTION INTRAVENOUS; SUBCUTANEOUS at 02:54

## 2018-10-11 NOTE — PROGRESS NOTES
Occupational Therapy Note 10/11/2018 Orders acknowledged, chart reviewed. Spoke with PT who reports pt is baseline with functional mobility/transfers and ADLs. Skilled acute OT not indicated at this time as pt is at baseline. Thank you for this consult.   
 
Lexis Lazo, OTR/L

## 2018-10-11 NOTE — PROGRESS NOTES
Consult noted for rehab resources for patient being discharged today. CM met with patient, offered to have a representative from [place] [phone] come to speak with him prior to discharge, he declined. He was agreeable to receive information on local support groups and inpatient programs. Per hospitalist son was notified of patient's discharge. Per patient his friend will be staying with him initially once discharged. Marny Cheadle RN,CRM

## 2018-10-11 NOTE — PROGRESS NOTES
[facility] Name: Sugey Weems MRN: [id number] : 1940 Hospital: [address] [address]  Date: 10/11/2018 IMPRESSION:  
1. AMS with EtOH withdrawal; improved 2. Seizures-> new onset likely from above; head CT w/o focal findings. Improving 3. Elevated LFTs 4. Label sepsis- no clear source--> cx'ed and started on empiric abx 5. Presenting c/o Abd pain- known GERD--> neg abd CT 6. HTN 
7. Incidental pulmonary nodule on CT  
  
RECOMMENDATIONS:  
· Vitimains · CMP to check on LFTs · Empiric abx for now ( LQ/Zosyn) · PPI · Can move out of ICU. · Discussed with attending · DVT prophylaxis SCDs-> add chemical rx if no sign bleeding · Needs elective f/u CT scan chest 6-12 month for pulm nodule for 5mm nodule Radiology ( personally reviewed) CXR and CTs abd/pelvis head reviewed ABG No results for input(s): PHI, PO2I, PCO2I in the last 72 hours. Subjective/Interval History:  
Doing ok. No acute complaints this am  
 
Patient Active Problem List  
Diagnosis Code  Seizure ([facility] R56.9  Altered mental status R41.82  Stroke ([facility] I63.9  Elevated LFTs R94.5 Past Medical History:  
Diagnosis Date  GERD (gastroesophageal reflux disease)  Hypertension Past Surgical History:  
Procedure Laterality Date  COLONOSCOPY Left 2017 COLONOSCOPY performed by Gadiel Jeffers MD at [facility] COLONOSCOPY  2013  
 with polyps Prior to Admission Medications Prescriptions Last Dose Informant Patient Reported? Taking?  
bismuth subsalicylate (PEPTO-BISMOL PO)   Yes Yes Sig: Take  by mouth.  
calcium carbonate (TUMS) 200 mg calcium (500 mg) chew   Yes Yes Sig: Take 1 Tab by mouth as needed. lisinopril (PRINIVIL, ZESTRIL) 40 mg tablet   Yes No  
Sig: Take 40 mg by mouth daily.   
omeprazole (PRILOSEC) 10 mg capsule   Yes No  
 Sig: Take 10 mg by mouth daily. Indications: PREVENTION OF NSAID-INDUCED GASTRIC ULCER Facility-Administered Medications: None No Known Allergies Social History Social History  Marital status:  Spouse name: N/A  
 Number of children: N/A  
 Years of education: N/A Occupational History  Not on file. Social History Main Topics  Smoking status: Former Smoker Packs/day: 0.25 Years: 11.00 Quit date: 1/1/1966  Smokeless tobacco: Not on file  Alcohol use Yes Comment: occasional  
 Drug use: Not on file  Sexual activity: Not on file Other Topics Concern  Not on file Social History Narrative History reviewed. No pertinent family history. ROS unobtainable (mental status) Objective:  
 
Vital Signs:   
Patient Vitals for the past 24 hrs: 
 Temp Pulse Resp BP SpO2  
10/11/18 0700 - 75 13 (!) 163/95 98 % 10/11/18 0600 - 96 23 (!) 163/97 98 % 10/11/18 0500 - 83 (!) 7 156/89 98 % 10/11/18 0400 98.5 °F (36.9 °C) 74 14 149/71 98 % 10/11/18 0100 - 92 16 142/84 97 % 10/11/18 0000 98.7 °F (37.1 °C) 92 28 93/60 97 % 10/10/18 2300 - 87 16 119/73 97 % 10/10/18 2200 - 71 16 140/74 97 % 10/10/18 2100 - 69 16 140/76 97 % 10/10/18 2000 98.8 °F (37.1 °C) 90 19 145/74 96 % 10/10/18 1900 98.3 °F (36.8 °C) 87 22 141/82 97 % 10/10/18 1800 - 96 16 (!) 155/92 94 % 10/10/18 1700 - 98 16 (!) 152/106 97 % 10/10/18 1600 98.3 °F (36.8 °C) 90 19 150/85 97 % 10/10/18 1500 - 84 18 143/90 96 % 10/10/18 1400 - 89 17 150/90 98 % 10/10/18 1300 - (!) 104 19 158/87 -  
10/10/18 1200 98.4 °F (36.9 °C) 99 12 (!) 161/96 100 % 10/10/18 1100 - 93 26 (!) 140/128 100 % 10/10/18 1000 - 93 14 (!) 147/127 98 % 10/10/18 0900 - 89 13 167/85 99 % Intake/Output:  
Last shift:        
Last 3 shifts:  RRIOLAST3 Intake/Output Summary (Last 24 hours) at 10/11/18 0857 Last data filed at 10/11/18 0700 Gross per 24 hour Intake          1795.83 ml Output             1105 ml Net           690.83 ml EXAM:  
 
Chronically ill WM In bed with wrist restraints NC/AT PERRL Moist MM Neck supple Chest clear anteriorly CV S1S2 RRR Abd soft Diaper LE no edema Moves all Skin w/o rash A/O x 3 Data I have personally reviewed data, flowsheets for the last 24 hours. Labs: 
Recent Labs 10/11/18 
 0025  10/10/18 
 4529  10/09/18 
 9280 WBC  5.8  6.3  7.9 HGB  12.5  12.4  13.0 HCT  34.6*  35.2*  36.7 PLT  258  241  238 Recent Labs 10/11/18 
 8284  10/10/18 
 2668  10/09/18 
 2571  10/08/18 
 1834 NA  142  141  142  136  
K  3.5  3.7  4.0  4.0  
CL  112*  109*  112*  104 CO2  19*  21  25  26 GLU  75  69  75  104* BUN  9  14  12  16 CREA  1.05  1.29  1.23  1.32* CA  7.3*  7.9*  7.7*  8.8 ALB   --    --   2.9*  3.7 SGOT   --    --   628*  770* ALT   --    --   602*  442* INR   --    --    --   1.0 CARON Stoddard 
[facility] [place]

## 2018-10-11 NOTE — PROGRESS NOTES
physical Therapy EVALUATION/DISCHARGE Patient: Charley Crespo (23 y.o. male) Date: 10/11/2018 Primary Diagnosis: Seizure ([address] Altered mental status Stroke [facility]) Elevated LFTs Precautions:   Seizure, Fall ASSESSMENT : 
Based on the objective data described below, the patient presents with independence with mobility nearly consistent with his baseline- slightly limited by mild gait instability and path deviations with head turns and quick direction change. Patient balance improved with B UE support. He has a walker at home and agreeable to use until balance improves. Patient significant other at bedside and on board to  patient with RW use. Recommend d/c home with RW when medically stable. Skilled physical therapy is not indicated at this time. PLAN : 
Discharge Recommendations: None Further Equipment Recommendations for Discharge: use RW SUBJECTIVE:  
Patient stated Im shacked up with my girlfriend.  OBJECTIVE DATA SUMMARY:  
HISTORY:   
Past Medical History:  
Diagnosis Date  GERD (gastroesophageal reflux disease)  Hypertension Past Surgical History:  
Procedure Laterality Date  COLONOSCOPY Left 11/20/2017 COLONOSCOPY performed by Wood Jean MD at [address] [place] [address] COLONOSCOPY  5/2013  
 with polyps Prior Level of Function/Home Situation: independent Personal factors and/or comorbidities impacting plan of care: ETOH Home Situation Home Environment: Private residence # Steps to Enter: 4 Rails to Enter: Yes Hand Rails : Bilateral 
One/Two Story Residence: One story Living Alone: No 
Support Systems: Spouse/Significant Other/Partner, Child(zurdo) Patient Expects to be Discharged to[de-identified] Private residence Current DME Used/Available at Home: None EXAMINATION/PRESENTATION/DECISION MAKING:  
Critical Behavior: 
Neurologic State: Alert Orientation Level: Oriented X4 
 Cognition: Appropriate decision making, Appropriate for age attention/concentration, Appropriate safety awareness, Follows commands Hearing: Auditory Auditory Impairment: None Range Of Motion: 
AROM: Within functional limits PROM: Within functional limits Strength:   
Strength: Generally decreased, functional 
  
  
  
  
  
  
Tone & Sensation:  
Tone: Normal 
  
  
  
  
  
  
  
  
   
Coordination: 
Coordination: Within functional limits Vision:  
  
Functional Mobility: 
Bed Mobility: 
Rolling: Independent Supine to Sit: Independent Sit to Supine: Independent Scooting: Independent Transfers: 
Sit to Stand: Supervision Stand to Sit: Supervision Stand Pivot Transfers: Supervision Balance:  
Sitting: Intact Standing: Intact Ambulation/Gait Training: 
Distance (ft): 300 Feet (ft) Assistive Device: Gait belt Ambulation - Level of Assistance: Stand-by assistance Gait Description (WDL): Exceptions to [facility] Gait Abnormalities: Decreased step clearance; Path deviations (deviations with turns) Base of Support: Narrowed Speed/Josee: Fluctuations Step Length: Right shortened;Left shortened Swing Pattern: Right symmetrical;Left symmetrical 
  
  
  
  
   
  
 Stairs: 
Number of Stairs Trained: 5 Stairs - Level of Assistance: Supervision Rail Use: Both Functional Measure: 
Tinetti test: 
 
Sitting Balance: 1 Arises: 2 Attempts to Rise: 2 Immediate Standing Balance: 2 Standing Balance: 2 Nudged: 2 Eyes Closed: 1 Turn 360 Degrees - Continuous/Discontinuous: 1 Turn 360 Degrees - Steady/Unsteady: 1 Sitting Down: 2 Balance Score: 16 Indication of Gait: 1 
R Step Length/Height: 1 L Step Length/Height: 1 
R Foot Clearance: 1 L Foot Clearance: 1 Step Symmetry: 1 Step Continuity: 1 Path: 0 Trunk: 0 Walking Time: 0 Gait Score: 7 Total Score: 23 Tinetti Test and G-code impairment scale: 
Percentage of Impairment CH 
 
0% CI 
 
1-19% CJ 
 
20-39% CK 
 
40-59% CL 
 
60-79% CM 
 
80-99% CN  
 
100% Tinetti Score 0-28 28 23-27 17-22 12-16 6-11 1-5 0 Tinetti Tool Score Risk of Falls 
<19 = High Fall Risk 19-24 = Moderate Fall Risk 25-28 = Low Fall Risk Tinetti ME. Performance-Oriented Assessment of Mobility Problems in Elderly Patients. [facility]; [id number]. (Scoring Description: PT Bulletin Feb. 10, 1993) Older adults: Sahil Maloney et al, 2009; n = [address] [employer] elderly evaluated with ABC, CLARIBEL, ADL, and IADL) · Mean CLARIBEL score for males aged 69-68 years = 26.21(3.40) · Mean CLARIBEL score for females age 69-68 years = 25.16(4.30) · Mean CLARIBEL score for males over 80 years = 23.29(6.02) · Mean CLARIBEL score for females over 80 years = 17.20(8.32) G codes: In compliance with CMSs Claims Based Outcome Reporting, the following G-code set was chosen for this patient based on their primary functional limitation being treated: The outcome measure chosen to determine the severity of the functional limitation was the Tinetti with a score of 23/28 which was correlated with the impairment scale. ? Mobility - Walking and Moving Around:  
  - CURRENT STATUS: CI - 1%-19% impaired, limited or restricted  - GOAL STATUS: CI - 1%-19% impaired, limited or restricted  - D/C STATUS:  CI - 1%-19% impaired, limited or restricted Physical Therapy Evaluation Charge Determination History Examination Presentation Decision-Making HIGH Complexity :3+ comorbidities / personal factors will impact the outcome/ POC  MEDIUM Complexity : 3 Standardized tests and measures addressing body structure, function, activity limitation and / or participation in recreation  LOW Complexity : Stable, uncomplicated  LOW Complexity : FOTO score of  Based on the above components, the patient evaluation is determined to be of the following complexity level: LOW Pain: 
Pain Scale 1: Numeric (0 - 10) Pain Intensity 1: 0 
  
 Activity Tolerance: VSS Please refer to the flowsheet for vital signs taken during this treatment. After treatment:  
[x]   Patient left in no apparent distress sitting up in chair 
[x]   Patient left in no apparent distress in bed 
[x]   Call bell left within reach 
[]   Nursing notified 
[x]   Caregiver present 
[]   Bed alarm activated COMMUNICATION/EDUCATION:  
Communication/Collaboration: 
[x]   Fall prevention education was provided and the patient/caregiver indicated understanding. [x]   Patient/family have participated as able and agree with findings and recommendations. []   Patient is unable to participate in plan of care at this time. Findings and recommendations were discussed with: Occupational Therapist and Registered Nurse Thank you for this referral. 
Jacob Pascual, PT ,DPT Time Calculation: 30 mins

## 2018-10-11 NOTE — DISCHARGE SUMMARY
Discharge Summary       PATIENT ID: Meera Booker  MRN: [id number]   YOB: 1940    DATE OF ADMISSION: 10/8/2018  6:32 PM    DATE OF DISCHARGE: 10/ 11/18  PRIMARY CARE PROVIDER: Zahdia Schilling MD     ATTENDING PHYSICIAN: Kati Resendez  DISCHARGING PROVIDER: Irene Snell MD    To contact this individual call [phone] and ask the  to page. If unavailable ask to be transferred the Adult Hospitalist Department. CONSULTATIONS: IP CONSULT TO HOSPITALIST  IP CONSULT TO TELE-NEUROLOGY  IP CONSULT TO INTENSIVIST    PROCEDURES/SURGERIES: * No surgery found *    ADMITTING [address] [facility] COURSE:    An 47-year-old white male with past medical history of GERD, hypertension, status post cholecystectomy, presented to the emergency department from home initially with chief complaint of epigastric abdominal pain.  According to the ER reports, the patient had epigastric abdominal pain that started approximately a week ago, that had remained constant, rated as a 5/10, moderate severity, without specific exacerbating or alleviating factors despite the use of Pepto-Bismol.  He reportedly drinks alcohol heavily and according to his girlfriend's report his last drink was approximately 2 days ago.  On arrival to the emergency department, initial recorded vital signs, blood pressure 153/89, heart rate 103, respiration rate 16, O2 saturation 98% on room air        Assessment & Plan:      1.  Seizure -- new onset.  Suspect possible alcohol withdrawal seizures.    - banana bag , CIWA  - cont thiamine, folate, multivitamin therapies.     2. Metabolic encephalopathy  resolved  - CT head.  Findings were age indeterminate microvascular ischemic disease.  Patient has no focal neurological deficits on current examination to suggest stroke.       3.  Systemic inflammatory response syndrome,- Low grade watch for now d/c on levaquin 3 more days treated with zosyn     4.  Abdominal pain epigastric.    - CT abdomen was unremarkable for any acute process to explain the same.  Patient has constipation with noted fecal retention noted on CT report.    - lipase - and ammonia - wnl     7.  Tachycardia with sinus arrhythmia.  resolved     8.  Hypertension /agitation. resolved     9.  Agitation with anxiety.  Plan as noted above.     10.  Elevated liver function tests with hyperbilirubinemia.    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  Chronic kidney disease 3, elevated creatinine -- noted.  Continue with IV fluid hydration resuscitation.     12.  Gastroesophageal reflux disease.  Continue Protonix. Chiqui Killian PENDING TEST RESULTS:   At the time of discharge the following test results are still pending:     FOLLOW UP APPOINTMENTS:    Follow-up Information     Follow up With Details Comments Chris Avina MD In 1 week Please call the practice to schedule your appointment  [address] [place] [address]  [phone]      ARPU  On 10/13/2018 [facility] will call the patient on Saturday October 13 @ 9:00 a.m. [phone]           ADDITIONAL CARE RECOMMENDATIONS:     DIET: Regular Diet and Cardiac Diet    ACTIVITY: Activity as tolerated    WOUND CARE:     EQUIPMENT needed:       DISCHARGE MEDICATIONS:  Current Discharge Medication List      START taking these medications    Details   levoFLOXacin (LEVAQUIN) 750 mg tablet Take 1 Tab by mouth every twenty-four (24) hours for 3 days. Qty: 3 Tab, Refills: 0      chlordiazePOXIDE (LIBRIUM) 5 mg capsule Take 1 Cap by mouth three (3) times daily as needed for Anxiety for up to 5 days. Max Daily Amount: 15 mg.  Qty: 15 Cap, Refills: 0    Associated Diagnoses: Alcohol withdrawal syndrome with complication ([facility]         CONTINUE these medications which have NOT CHANGED    Details   calcium carbonate (TUMS) 200 mg calcium (500 mg) chew Take 1 Tab by mouth as needed. bismuth subsalicylate (PEPTO-BISMOL PO) Take  by mouth.       omeprazole (PRILOSEC) 10 mg capsule Take 10 mg by mouth daily. Indications: PREVENTION OF NSAID-INDUCED GASTRIC ULCER      lisinopril (PRINIVIL, ZESTRIL) 40 mg tablet Take 40 mg by mouth daily. NOTIFY YOUR PHYSICIAN FOR ANY OF THE FOLLOWING:   Fever over 101 degrees for 24 hours. Chest pain, shortness of breath, fever, chills, nausea, vomiting, diarrhea, change in mentation, falling, weakness, bleeding. Severe pain or pain not relieved by medications. Or, any other signs or symptoms that you may have questions about. DISPOSITION:   x Home With:   OT  PT  HH  RN       Long term SNF/Inpatient Rehab    Independent/assisted living    Hospice    Other:       PATIENT CONDITION AT DISCHARGE:     Functional status    Poor     Deconditioned    x Independent      Cognition   x  Lucid     Forgetful     Dementia      Catheters/lines (plus indication)    Bernstein     PICC     PEG    x None      Code status    x Full code     DNR      PHYSICAL EXAMINATION AT DISCHARGE:     Constitutional:  No acute distress,    ENT:  Oral mucous moist,    Resp:  CTA bilaterally. CV:  Regular rhythm, normal rate,     GI:  Soft, non distended, non tender. bs=    Musculoskeletal:  No edema, warm, 2+ pulses throughout    Neurologic:  Moves all extremities. Awake on precedex ,                                              Psych:  , Not anxious nor agitated.         CHRONIC MEDICAL DIAGNOSES:  Problem List as of 10/11/2018  Date Reviewed: 10/11/2018          Codes Class Noted - Resolved    RESOLVED: Seizure ([facility] ICD-10-CM: R56.9  ICD-9-CM: 780.39  10/8/2018 - 10/11/2018        RESOLVED: Altered mental status ICD-10-CM: R41.82  ICD-9-CM: 780.97  10/8/2018 - 10/11/2018        * (Principal)RESOLVED: Stroke ([facility] ICD-10-CM: I63.9  ICD-9-CM: 434.91  10/8/2018 - 10/11/2018        RESOLVED: Elevated LFTs ICD-10-CM: R94.5  ICD-9-CM: 790.6  10/8/2018 - 10/11/2018              Greater than 30  minutes were spent with the patient on counseling and coordination of care    Signed:   Anette Clark MD  10/11/2018  11:46 AM

## 2018-10-11 NOTE — DISCHARGE INSTRUCTIONS
Discharge Instructions       PATIENT ID: Valentina Shaver  MRN: [id number]   YOB: 1940    DATE OF ADMISSION: 10/8/2018  6:32 PM    DATE OF DISCHARGE: 10/11/2018    PRIMARY CARE PROVIDER: Lauro Doran MD     ATTENDING PHYSICIAN: Jason Patel MD  DISCHARGING PROVIDER: Jason Patel MD    To contact this individual call [phone] and ask the  to page. If unavailable ask to be transferred the Adult Hospitalist Department. DISCHARGE DIAGNOSES ETOH abuse    CONSULTATIONS: IP CONSULT TO HOSPITALIST  IP CONSULT TO TELE-NEUROLOGY  IP CONSULT TO INTENSIVIST    PROCEDURES/SURGERIES: * No surgery found *    PENDING TEST RESULTS:   At the time of discharge the following test results are still pending:     FOLLOW UP APPOINTMENTS:   Follow-up Information     Follow up With Details Comments Chris Avina MD In 1 week  [id number] [facility]  [phone]             ADDITIONAL CARE RECOMMENDATIONS:     DIET: Regular Diet and Cardiac Diet    ACTIVITY: Activity as tolerated    WOUND CARE:     EQUIPMENT needed:       DISCHARGE MEDICATIONS:   See Medication Reconciliation Form    · It is important that you take the medication exactly as they are prescribed. · Keep your medication in the bottles provided by the pharmacist and keep a list of the medication names, dosages, and times to be taken in your wallet. · Do not take other medications without consulting your doctor. NOTIFY YOUR PHYSICIAN FOR ANY OF THE FOLLOWING:   Fever over 101 degrees for 24 hours. Chest pain, shortness of breath, fever, chills, nausea, vomiting, diarrhea, change in mentation, falling, weakness, bleeding. Severe pain or pain not relieved by medications. Or, any other signs or symptoms that you may have questions about.       DISPOSITION:  x  Home With:   OT  PT  HH  RN       SNF/Inpatient Rehab/LTAC    Independent/assisted living    Hospice    Other:     CDMP Checked: Yes x     PROBLEM LIST Updated:  Yes x       Signed:   Regina Schultz MD  10/11/2018  9:13 AM    [employer] Activation    Thank you for requesting access to [employer]ie. Please follow the instructions below to securely access and download your online medical record. Gravie allows you to send messages to your doctor, view your test results, renew your prescriptions, schedule appointments, and more. How Do I Sign Up? 1. In your internet browser, go to [email or web address]  2. Click on the First Time User? Click Here link in the Sign In box. You will be redirect to the New Member Sign Up page. 3. Enter your Gravie Access Code exactly as it appears below. You will not need to use this code after youve completed the sign-up process. If you do not sign up before the expiration date, you must request a new code. Gravie Access Code: Activation code not generated  Current Gravie Status: Active (This is the date your Gravie access code will )    4. Enter the last four digits of your Social Security Number (xxxx) and Date of Birth (mm/dd/yyyy) as indicated and click Submit. You will be taken to the next sign-up page. 5. Create a Gravie ID. This will be your Gravie login ID and cannot be changed, so think of one that is secure and easy to remember. 6. Create a Gravie password. You can change your password at any time. 7. Enter your Password Reset Question and Answer. This can be used at a later time if you forget your password. 8. Enter your e-mail address. You will receive e-mail notification when new information is available in [address] 9. Click Sign Up. You can now view and download portions of your medical record. 10. Click the Download Summary menu link to download a portable copy of your medical information. Additional Information    If you have questions, please visit the Frequently Asked Questions section of the Gravie website at [email or web address] Remember, MyChart is NOT to be used for urgent needs. For medical emergencies, dial 911. DISCHARGE SUMMARY from Nurse    PATIENT INSTRUCTIONS:        These are general instructions for a healthy lifestyle:    No smoking/ No tobacco products/ Avoid exposure to second hand smoke  Surgeon General's Warning:  Quitting smoking now greatly reduces serious risk to your health. Obesity, smoking, and sedentary lifestyle greatly increases your risk for illness    A healthy diet, regular physical exercise & weight monitoring are important for maintaining a healthy lifestyle    You may be retaining fluid if you have a history of heart failure or if you experience any of the following symptoms:  Weight gain of 3 pounds or more overnight or 5 pounds in a week, increased swelling in our hands or feet or shortness of breath while lying flat in bed. Please call your doctor as soon as you notice any of these symptoms; do not wait until your next office visit. Recognize signs and symptoms of STROKE:    F-face looks uneven    A-arms unable to move or move unevenly    S-speech slurred or non-existent    T-time-call 911 as soon as signs and symptoms begin-DO NOT go       Back to bed or wait to see if you get better-TIME IS BRAIN. Warning Signs of HEART ATTACK     Call 911 if you have these symptoms:   Chest discomfort. Most heart attacks involve discomfort in the center of the chest that lasts more than a few minutes, or that goes away and comes back. It can feel like uncomfortable pressure, squeezing, fullness, or pain.  Discomfort in other areas of the upper body. Symptoms can include pain or discomfort in one or both arms, the back, neck, jaw, or stomach.  Shortness of breath with or without chest discomfort.  Other signs may include breaking out in a cold sweat, nausea, or lightheadedness. Don't wait more than five minutes to call 911 - MINUTES MATTER! Fast action can save your life.  Calling 911 is almost always the fastest way to get lifesaving treatment. Emergency Medical Services staff can begin treatment when they arrive -- up to an hour sooner than if someone gets to the hospital by car. The discharge information has been reviewed with the patient. The patient verbalized understanding. Discharge medications reviewed with the patient and appropriate educational materials and side effects teaching were provided.   ___________________________________________________________________________________________________________________________________

## 2018-10-11 NOTE — PROGRESS NOTES
Problem: Falls - Risk of 
Goal: *Absence of Falls Document Eloisa Mirza Fall Risk and appropriate interventions in the flowsheet. Outcome: Progressing Towards Goal 
Fall Risk Interventions: 
Mobility Interventions: Patient to call before getting OOB Mentation Interventions: Adequate sleep, hydration, pain control Medication Interventions: Patient to call before getting OOB Elimination Interventions: Call light in reach Problem: Pressure Injury - Risk of 
Goal: *Prevention of pressure injury Document El Scale and appropriate interventions in the flowsheet. Outcome: Progressing Towards Goal 
Pressure Injury Interventions: 
Sensory Interventions: Assess changes in LOC Moisture Interventions: Apply protective barrier, creams and emollients Activity Interventions: Pressure redistribution bed/mattress(bed type) Mobility Interventions: Pressure redistribution bed/mattress (bed type) Nutrition Interventions: Document food/fluid/supplement intake Friction and Shear Interventions: Apply protective barrier, creams and emollients

## 2018-10-12 ENCOUNTER — PATIENT OUTREACH (OUTPATIENT)
Dept: INTERNAL MEDICINE CLINIC | Age: 78
End: 2018-10-12

## 2018-10-12 NOTE — PROGRESS NOTES
[facility] 10/8-10/11/2018:  Stroke Patient on Value based Patient discharge report dated 10/12/2018. Left message on voice mail with my contact information. Need to assess for discharge needs.

## 2018-10-14 LAB
BACTERIA SPEC CULT: NORMAL
SERVICE CMNT-IMP: NORMAL

## 2018-10-15 ENCOUNTER — PATIENT OUTREACH (OUTPATIENT)
Dept: INTERNAL MEDICINE CLINIC | Age: 78
End: 2018-10-15

## 2018-10-15 NOTE — PROGRESS NOTES
Goals Addressed Most Recent  Facilitate transitions of care (ie. palliative care, assisted living, nursing home, changes in living arrangements). On track (10/15/2018) 10/15/2018: Serjio De Oliveira [facility] 10/8-10/11/2018:  Stroke f/u :  Patient was noted to have been discharged home vs Rehab; son denied rehab stating to CM one would be with patient in he home. No answer again today. NN w/ continue to f/u.  EW  
  
  
Goals:  1. To confirm PCP. 2.  To complete Post d/c assessment.

## 2018-11-25 ENCOUNTER — HOSPITAL ENCOUNTER (OUTPATIENT)
Dept: MRI IMAGING | Age: 78
Discharge: HOME | End: 2018-11-25
Attending: SPECIALIST
Payer: MEDICARE

## 2018-11-25 DIAGNOSIS — R10.13 ABDOMINAL PAIN, EPIGASTRIC: ICD-10-CM

## 2018-11-25 DIAGNOSIS — R91.1 LUNG NODULE: ICD-10-CM

## 2018-11-25 DIAGNOSIS — F10.10 ALCOHOL ABUSE: ICD-10-CM

## 2018-11-25 PROCEDURE — 74183 MRI ABD W/O CNTR FLWD CNTR: CPT

## 2018-11-25 PROCEDURE — 74011250636 HC RX REV CODE- 250/636: Performed by: SPECIALIST

## 2018-11-25 PROCEDURE — 74011000258 HC RX REV CODE- 258: Performed by: SPECIALIST

## 2018-11-25 PROCEDURE — A9585 GADOBUTROL INJECTION: HCPCS | Performed by: SPECIALIST

## 2018-11-25 RX ADMIN — SODIUM CHLORIDE 100 ML: 900 INJECTION, SOLUTION INTRAVENOUS at 10:00

## 2018-11-25 RX ADMIN — GADOBUTROL 7.5 ML: 604.72 INJECTION INTRAVENOUS at 09:29

## 2018-11-27 ENCOUNTER — PATIENT OUTREACH (OUTPATIENT)
Dept: INTERNAL MEDICINE CLINIC | Age: 78
End: 2018-11-27

## 2018-11-27 NOTE — LETTER
11/27/2018 2:58 PM 
 
Mr. Sugey Weems [address] [place] [id number] Dear Mr. Gabrielle Miller am the R.N. Nurse Navigator working with  [address] [place] [address]. We are glad to hear you are home and hope you are feeling much better. Part of my job is to follow up with patients who have been to the hospital to see how they are feeling, answer any questions they may have about their visit and/or procedure, as well as make sure they have a follow-up appointment to see their primary care doctor-Dr. Jacklyn Blank. .   
 
I have been unable to reach you by telephone and wanted to make sure that you scheduled a follow-up appointment to come in and talk to Dr. Jacklyn Blank about your recent visit to the hospital if you have not already done so. Please call his  office to schedule your appointment. In the meantime, if you have any questions or concerns, please feel free to call me on my direct line at [phone]. Thank you for allowing [facility] to provide you with excellent care. Our goal is to address all of your concerns and needs. We strive to provide excellent quality care at our Medical Practice here at [address] [place] [address]. We strive to be rated as excellent, as anything less than excellent does not meet our expectations. Thank you again for choosing [address] [place] [address] for your continued health care needs. Sincerely, Jessica Taylor RN Registered Nurse Navigator Madisyn Davey [address] [facility] [address]lette/Transportation service

## 2018-11-27 NOTE — PROGRESS NOTES
NNTOCIP [facility] 11/20/2018:  Endoscopy Patient on Value-Based Patient discharge report dated 11/23/2018. Undersigned left message on voice mail with my contact information for a return call from patient; also LM for return call from [employer] office. Patient previously admitted to [facility] 10/8-10/11  [facility] for Stroke. This Endoscopy was a scheduled admission thus not a readmit for the same. Patient's c/o epigastric pain; patient is also shown to have alcohol abuse.

## 2018-12-06 ENCOUNTER — PATIENT OUTREACH (OUTPATIENT)
Dept: INTERNAL MEDICINE CLINIC | Age: 78
End: 2018-12-06

## 2018-12-06 NOTE — PROGRESS NOTES
[facility] 10/8-10/11/2018:  Stroke f/u 
 
Goals Addressed This Visit's Progress  Facilitate transitions of care (ie. palliative care, assisted living, nursing home, changes in living arrangements). On track 10/15/2018: Phan Philippe [facility] 10/8-10/11/2018:  Stroke f/u :  Patient was noted to have been discharged home vs Rehab; son denied rehab stating to  one would be with patient in he home. No answer again today. NN w/ continue to f/u.  EW  
 
12/6/2018: Value-Based Patient:   NN received call w/ c/o having MRI on 11/25 and recent blood work which showed changes that needs to be discussed with him. States he has contacted PCP's office with no response. NN spoke with PCP Declan's nurse Satinder Barber at [email or web address] [email or web address] who agreed to get message to Dr. Kathia Garcia for management of request.  EW 
 
  
  
Goal completion:   12/20/2018

## 2019-06-07 ENCOUNTER — HOSPITAL ENCOUNTER (OUTPATIENT)
Dept: CT IMAGING | Age: 79
Discharge: HOME | End: 2019-06-07
Attending: INTERNAL MEDICINE
Payer: MEDICARE

## 2019-06-07 DIAGNOSIS — R91.1 LUNG NODULE: ICD-10-CM

## 2019-06-07 PROCEDURE — 71250 CT THORAX DX C-: CPT

## 2019-08-05 ENCOUNTER — HOSPITAL ENCOUNTER (OUTPATIENT)
Dept: PET IMAGING | Age: 79
Discharge: HOME | End: 2019-08-05
Attending: INTERNAL MEDICINE
Payer: MEDICARE

## 2019-08-05 VITALS — HEIGHT: 69 IN | BODY MASS INDEX: 25.18 KG/M2 | WEIGHT: 170 LBS

## 2019-08-05 DIAGNOSIS — R91.8 MULTIPLE PULMONARY NODULES: ICD-10-CM

## 2019-08-05 PROCEDURE — A9552 F18 FDG: HCPCS

## 2019-08-05 RX ORDER — SODIUM CHLORIDE 0.9 % (FLUSH) 0.9 %
10 SYRINGE (ML) INJECTION
Status: COMPLETED | OUTPATIENT
Start: 2019-08-05 | End: 2019-08-05

## 2019-08-05 RX ADMIN — Medication 10 ML: at 07:18

## 2020-02-04 ENCOUNTER — HOSPITAL ENCOUNTER (OUTPATIENT)
Dept: CT IMAGING | Age: 80
Discharge: HOME | End: 2020-02-04
Attending: INTERNAL MEDICINE
Payer: MEDICARE

## 2020-02-04 DIAGNOSIS — R91.8 MULTIPLE PULMONARY NODULES: ICD-10-CM

## 2020-02-04 PROCEDURE — 71250 CT THORAX DX C-: CPT

## 2020-09-10 ENCOUNTER — HOSPITAL ENCOUNTER (EMERGENCY)
Age: 80
Discharge: HOME | End: 2020-09-10
Attending: EMERGENCY MEDICINE | Admitting: EMERGENCY MEDICINE
Payer: MEDICARE

## 2020-09-10 ENCOUNTER — APPOINTMENT (OUTPATIENT)
Dept: ULTRASOUND IMAGING | Age: 80
End: 2020-09-10
Attending: EMERGENCY MEDICINE
Payer: MEDICARE

## 2020-09-10 ENCOUNTER — APPOINTMENT (OUTPATIENT)
Dept: CT IMAGING | Age: 80
End: 2020-09-10
Attending: EMERGENCY MEDICINE
Payer: MEDICARE

## 2020-09-10 VITALS
DIASTOLIC BLOOD PRESSURE: 76 MMHG | TEMPERATURE: 99.1 F | SYSTOLIC BLOOD PRESSURE: 146 MMHG | HEART RATE: 96 BPM | RESPIRATION RATE: 18 BRPM | OXYGEN SATURATION: 98 %

## 2020-09-10 DIAGNOSIS — R74.01 TRANSAMINITIS: ICD-10-CM

## 2020-09-10 DIAGNOSIS — R10.13 ABDOMINAL PAIN, EPIGASTRIC: Primary | ICD-10-CM

## 2020-09-10 LAB
ALBUMIN SERPL-MCNC: 3.8 G/DL (ref 3.5–5)
ALBUMIN/GLOB SERPL: 1 {RATIO} (ref 1.1–2.2)
ALP SERPL-CCNC: 145 U/L (ref 45–117)
ALT SERPL-CCNC: 137 U/L (ref 12–78)
ANION GAP SERPL CALC-SCNC: 5 MMOL/L (ref 5–15)
AST SERPL-CCNC: 193 U/L (ref 15–37)
BASOPHILS # BLD: 0.1 K/UL (ref 0–0.1)
BASOPHILS NFR BLD: 1 % (ref 0–1)
BILIRUB SERPL-MCNC: 1.6 MG/DL (ref 0.2–1)
BUN SERPL-MCNC: 23 MG/DL (ref 6–20)
BUN/CREAT SERPL: 17 (ref 12–20)
CALCIUM SERPL-MCNC: 9.5 MG/DL (ref 8.5–10.1)
CHLORIDE SERPL-SCNC: 106 MMOL/L (ref 97–108)
CO2 SERPL-SCNC: 26 MMOL/L (ref 21–32)
COMMENT, HOLDF: NORMAL
CREAT SERPL-MCNC: 1.35 MG/DL (ref 0.7–1.3)
DIFFERENTIAL METHOD BLD: NORMAL
EOSINOPHIL # BLD: 0.1 K/UL (ref 0–0.4)
EOSINOPHIL NFR BLD: 1 % (ref 0–7)
ERYTHROCYTE [DISTWIDTH] IN BLOOD BY AUTOMATED COUNT: 12.4 % (ref 11.5–14.5)
GLOBULIN SER CALC-MCNC: 4 G/DL (ref 2–4)
GLUCOSE SERPL-MCNC: 79 MG/DL (ref 65–100)
HCT VFR BLD AUTO: 45.4 % (ref 36.6–50.3)
HGB BLD-MCNC: 15.5 G/DL (ref 12.1–17)
IMM GRANULOCYTES # BLD AUTO: 0 K/UL (ref 0–0.04)
IMM GRANULOCYTES NFR BLD AUTO: 0 % (ref 0–0.5)
LIPASE SERPL-CCNC: 156 U/L (ref 73–393)
LYMPHOCYTES # BLD: 2.6 K/UL (ref 0.8–3.5)
LYMPHOCYTES NFR BLD: 25 % (ref 12–49)
MCH RBC QN AUTO: 30.2 PG (ref 26–34)
MCHC RBC AUTO-ENTMCNC: 34.1 G/DL (ref 30–36.5)
MCV RBC AUTO: 88.5 FL (ref 80–99)
MONOCYTES # BLD: 0.8 K/UL (ref 0–1)
MONOCYTES NFR BLD: 8 % (ref 5–13)
NEUTS SEG # BLD: 6.9 K/UL (ref 1.8–8)
NEUTS SEG NFR BLD: 65 % (ref 32–75)
NRBC # BLD: 0 K/UL (ref 0–0.01)
NRBC BLD-RTO: 0 PER 100 WBC
PLATELET # BLD AUTO: 314 K/UL (ref 150–400)
PMV BLD AUTO: 10.1 FL (ref 8.9–12.9)
POTASSIUM SERPL-SCNC: 4.6 MMOL/L (ref 3.5–5.1)
PROT SERPL-MCNC: 7.8 G/DL (ref 6.4–8.2)
RBC # BLD AUTO: 5.13 M/UL (ref 4.1–5.7)
SAMPLES BEING HELD,HOLD: NORMAL
SODIUM SERPL-SCNC: 137 MMOL/L (ref 136–145)
TROPONIN I SERPL-MCNC: <0.05 NG/ML
WBC # BLD AUTO: 10.4 K/UL (ref 4.1–11.1)

## 2020-09-10 PROCEDURE — 99284 EMERGENCY DEPT VISIT MOD MDM: CPT

## 2020-09-10 PROCEDURE — 36415 COLL VENOUS BLD VENIPUNCTURE: CPT

## 2020-09-10 PROCEDURE — 85025 COMPLETE CBC W/AUTO DIFF WBC: CPT

## 2020-09-10 PROCEDURE — 83690 ASSAY OF LIPASE: CPT

## 2020-09-10 PROCEDURE — 84484 ASSAY OF TROPONIN QUANT: CPT

## 2020-09-10 PROCEDURE — 74177 CT ABD & PELVIS W/CONTRAST: CPT

## 2020-09-10 PROCEDURE — 80053 COMPREHEN METABOLIC PANEL: CPT

## 2020-09-10 PROCEDURE — 74011000258 HC RX REV CODE- 258: Performed by: EMERGENCY MEDICINE

## 2020-09-10 PROCEDURE — 74011000636 HC RX REV CODE- 636: Performed by: EMERGENCY MEDICINE

## 2020-09-10 PROCEDURE — 76705 ECHO EXAM OF ABDOMEN: CPT

## 2020-09-10 PROCEDURE — 93005 ELECTROCARDIOGRAM TRACING: CPT

## 2020-09-10 RX ORDER — FAMOTIDINE 20 MG/1
20 TABLET, FILM COATED ORAL 2 TIMES DAILY
Qty: 20 TAB | Refills: 0 | Status: SHIPPED | OUTPATIENT
Start: 2020-09-10 | End: 2020-09-20

## 2020-09-10 RX ORDER — SODIUM CHLORIDE 0.9 % (FLUSH) 0.9 %
10 SYRINGE (ML) INJECTION
Status: COMPLETED | OUTPATIENT
Start: 2020-09-10 | End: 2020-09-10

## 2020-09-10 RX ADMIN — Medication 10 ML: at 15:24

## 2020-09-10 RX ADMIN — SODIUM CHLORIDE 100 ML: 900 INJECTION, SOLUTION INTRAVENOUS at 15:24

## 2020-09-10 RX ADMIN — IOPAMIDOL 100 ML: 755 INJECTION, SOLUTION INTRAVENOUS at 15:24

## 2020-09-10 NOTE — ED NOTES
Sign out received from Dr Cathlean Scheuermann pending CT scan. Plan for US if CT scan negative for acute pathology. No acute reason for transaminitis and epigastric pain noted. Discussed with Dr Despina Frankel (GI on call). He recommends discharge if pt is symptom free - no pain, fever or leukocytosis. On reeval, pt remains symptom free. Recommended using maalox OTC and adding pepcid to regimen. Stable for dc. RTER precautions provided.      Nitish Otoole MD

## 2020-09-10 NOTE — ED PROVIDER NOTES
HPI .  Patient has hypertension and GERD. He has had a cholecystectomy. Patient has been having recurrent epigastric pain. Pain is severe. Patient had to stop driving his car last week because of a severe episode of pain. After food yesterday and today patient developed pain again. Pain is severe and intermittent. Pain is sharp. It does not radiate. Lying prone and taking Pepto-Bismol seemed to make the episodes go away. Patient does not complain of weight loss. Past Medical History:   Diagnosis Date    GERD (gastroesophageal reflux disease)     Hypertension        Past Surgical History:   Procedure Laterality Date    COLONOSCOPY Left 2017    COLONOSCOPY performed by Romana Opitz, [facility] at [facility] ENDOSCOPY    HX CHOLECYSTECTOMY      HX COLONOSCOPY  2013    with polyps         History reviewed. No pertinent family history.     Social History     Socioeconomic History    Marital status:      Spouse name: Not on file    Number of children: Not on file    Years of education: Not on file    Highest education level: Not on file   Occupational History    Not on file   Social Needs    Financial resource strain: Not on file    Food insecurity     Worry: Not on file     Inability: Not on file    Transportation needs     Medical: Not on file     Non-medical: Not on file   Tobacco Use    Smoking status: Former Smoker     Packs/day: 0.25     Years: 11.00     Pack years: 2.75     Last attempt to quit: 1966     Years since quittin.7   Substance and Sexual Activity    Alcohol use: Yes     Comment: occasional    Drug use: Not on file    Sexual activity: Not on file   Lifestyle    Physical activity     Days per week: Not on file     Minutes per session: Not on file    Stress: Not on file   Relationships    Social connections     Talks on phone: Not on file     Gets together: Not on file     Attends [employer] service: Not on file     Active member of club or organization: Not on file     Attends meetings of clubs or organizations: Not on file     Relationship status: Not on file    Intimate partner violence     Fear of current or ex partner: Not on file     Emotionally abused: Not on file     Physically abused: Not on file     Forced sexual activity: Not on file   Other Topics Concern    Not on file   Social History Narrative    Not on file         ALLERGIES: Patient has no known allergies. Review of Systems   All other systems reviewed and are negative. Vitals:    09/10/20 1205   BP: 124/78   Pulse: 79   Resp: 16   Temp: 98.7 °F (37.1 °C)   SpO2: 98%            Physical Exam  Vitals signs and nursing note reviewed. Constitutional:       Appearance: He is well-developed. HENT:      Head: Normocephalic and atraumatic. Eyes:      Pupils: Pupils are equal, round, and reactive to light. Neck:      Musculoskeletal: Normal range of motion and neck supple. Cardiovascular:      Rate and Rhythm: Normal rate and regular rhythm. Heart sounds: Normal heart sounds. No murmur. No friction rub. No gallop. Pulmonary:      Effort: Pulmonary effort is normal. No respiratory distress. Breath sounds: No wheezing or rales. Abdominal:      Palpations: Abdomen is soft. Tenderness: There is no abdominal tenderness. There is no rebound. Musculoskeletal: Normal range of motion. General: No tenderness. Skin:     Findings: No erythema. Neurological:      Mental Status: He is alert. Cranial Nerves: No cranial nerve deficit. Comments: Motor; symmetric   Psychiatric:         Behavior: Behavior normal.          MDM       Procedures        ED EKG interpretation:  Rhythm: normal sinus rhythm; and regular . Rate (approx.): 80; Axis: normal; P wave: normal; QRS interval: normal ; ST/T wave: normal; in  Lead: ; Other findings: . This EKG was interpreted by Aurora Guan MD,ED Provider.  1:08 PM             Case discussed with Dr. Jessica Cee; CT scan, possible ultrasound, and gastroenterology consultation discussed.  Lluvia Puckett MD  10:02 AM  (9-11-20)

## 2020-09-10 NOTE — ED TRIAGE NOTES
Arrives ambulatory with spouse for c/o epigastric pain that started after eating this morning. Had similar episode yesterday. Pt reports pain is subsiding now.

## 2020-09-10 NOTE — DISCHARGE INSTRUCTIONS
Your liver function tests were elevated. Your ultrasound and CT scan did not show any acute abnormalities. We spoke with the GI doctors, they would like you to follow up in clinic. Please call them in the morning to schedule follow up. Take pepcid along with your current prilosec. Using maalox may also help with symptoms. Thank you.

## 2020-09-11 LAB
ATRIAL RATE: 79 BPM
CALCULATED P AXIS, ECG09: 56 DEGREES
CALCULATED R AXIS, ECG10: -9 DEGREES
CALCULATED T AXIS, ECG11: 44 DEGREES
DIAGNOSIS, 93000: NORMAL
P-R INTERVAL, ECG05: 158 MS
Q-T INTERVAL, ECG07: 378 MS
QRS DURATION, ECG06: 98 MS
QTC CALCULATION (BEZET), ECG08: 433 MS
VENTRICULAR RATE, ECG03: 79 BPM

## 2020-10-03 ENCOUNTER — TRANSCRIBE ORDER (OUTPATIENT)
Dept: SCHEDULING | Age: 80
End: 2020-10-03

## 2020-10-03 DIAGNOSIS — R10.13 ABDOMINAL PAIN, EPIGASTRIC: Primary | ICD-10-CM

## 2020-10-03 DIAGNOSIS — R74.8 INCREASED LIVER ENZYMES: ICD-10-CM

## 2021-01-14 ENCOUNTER — HOSPITAL ENCOUNTER (OUTPATIENT)
Dept: CT IMAGING | Age: 81
Discharge: HOME | End: 2021-01-14
Attending: INTERNAL MEDICINE
Payer: MEDICARE

## 2021-01-14 DIAGNOSIS — R91.8 MULTIPLE PULMONARY NODULES: ICD-10-CM

## 2021-01-14 PROCEDURE — 71250 CT THORAX DX C-: CPT

## 2021-05-10 ENCOUNTER — TELEPHONE (OUTPATIENT)
Dept: SURGERY | Age: 81
End: 2021-05-10

## 2021-05-10 ENCOUNTER — OFFICE VISIT (OUTPATIENT)
Dept: SURGERY | Age: 81
End: 2021-05-10
Payer: MEDICARE

## 2021-05-10 VITALS
HEIGHT: 69 IN | SYSTOLIC BLOOD PRESSURE: 149 MMHG | WEIGHT: 163 LBS | OXYGEN SATURATION: 97 % | BODY MASS INDEX: 24.14 KG/M2 | HEART RATE: 95 BPM | TEMPERATURE: 97.7 F | RESPIRATION RATE: 18 BRPM | DIASTOLIC BLOOD PRESSURE: 94 MMHG

## 2021-05-10 DIAGNOSIS — K40.90 RIGHT INGUINAL HERNIA: Primary | ICD-10-CM

## 2021-05-10 PROCEDURE — G8510 SCR DEP NEG, NO PLAN REQD: HCPCS | Performed by: SURGERY

## 2021-05-10 PROCEDURE — 99202 OFFICE O/P NEW SF 15 MIN: CPT | Performed by: SURGERY

## 2021-05-10 PROCEDURE — 1101F PT FALLS ASSESS-DOCD LE1/YR: CPT | Performed by: SURGERY

## 2021-05-10 PROCEDURE — G8536 NO DOC ELDER MAL SCRN: HCPCS | Performed by: SURGERY

## 2021-05-10 PROCEDURE — G8427 DOCREV CUR MEDS BY ELIG CLIN: HCPCS | Performed by: SURGERY

## 2021-05-10 PROCEDURE — G8420 CALC BMI NORM PARAMETERS: HCPCS | Performed by: SURGERY

## 2021-05-10 NOTE — TELEPHONE ENCOUNTER
Returned call to patient. Two patient identifiers used. Patient has an appointment w/Dr. Anitra Siemens this afternoon.

## 2021-05-10 NOTE — TELEPHONE ENCOUNTER
Pt called trying to make a NP appointment but said he is in to much pain to wait till June 1st (first NP apt available) Pt was referred here in 2017 by Candy Brink. Pt complaining of severe abdominal pain.

## 2021-05-10 NOTE — PROGRESS NOTES
1. Have you been to the ER, urgent care clinic since your last visit? Hospitalized since your last visit? No    2. Have you seen or consulted any other health care providers outside of the [address] [place] [address] since your last visit? Include any pap smears or colon screening.  No

## 2021-05-10 NOTE — LETTER
5/10/2021 Patient: Marcos Shipley YOB: 1940 Date of Visit: 5/10/2021 Samantha Hannah MD 
[address] [place] [id number] [address] Fax: [phone] Dear Samantha Hannah MD, Thank you for referring Mr. Jose De Jesus Chacon to [facility] for evaluation. My notes for this consultation are attached. If you have questions, please do not hesitate to call me. I look forward to following your patient along with you. Sincerely, Austin Cralos MD

## 2021-05-10 NOTE — PROGRESS NOTES
Subjective:      Marcelle Gomes  is a 78 y.o. male presents for evaluation of possible RIGHT inguinal hernia. Pt reports significant pain this morning which lead him to moving his appointment up sooner, however, is no longer having that pain. Pt reports he's had this hernia for the last 5 years and has been wearing a truss for support. He notes truss is no longer working as well. Past Medical History:   Diagnosis Date    GERD (gastroesophageal reflux disease)     Hypertension     Right inguinal hernia 5/10/2021       Past Surgical History:   Procedure Laterality Date    COLONOSCOPY Left 2017    COLONOSCOPY performed by Lindy Hansen MD at [facility] ENDOSCOPY    HX CHOLECYSTECTOMY      HX COLONOSCOPY  2013    with polyps       Social History     Tobacco Use    Smoking status: Former Smoker     Packs/day: 0.25     Years: 11.00     Pack years: 2.75     Quit date: 1966     Years since quittin.3    Smokeless tobacco: Never Used   Substance Use Topics    Alcohol use: Yes     Comment: occasional       History reviewed. No pertinent family history. Current Outpatient Medications on File Prior to Visit   Medication Sig Dispense Refill    omeprazole (PRILOSEC) 10 mg capsule Take 10 mg by mouth daily. Indications: PREVENTION OF NSAID-INDUCED GASTRIC ULCER      lisinopril (PRINIVIL, ZESTRIL) 40 mg tablet Take 40 mg by mouth daily.  calcium carbonate (TUMS) 200 mg calcium (500 mg) chew Take 1 Tab by mouth as needed.  bismuth subsalicylate (PEPTO-BISMOL PO) Take  by mouth. No current facility-administered medications on file prior to visit.         No Known Allergies      Review of Systems:  Constitutional: No fever or chills  Neurologic: No headache  Eyes: No scleral icterus or irritated eyes  Nose: No nasal pain or drainage  Mouth: No oral lesions or sore throat  Cardiac: Positive for hypertension  Pulmonary: No cough or shortness or breath  Gastrointestinal: Positive for GERD  Genitourinary: No dysuria  Musculoskeletal: No muscle or joint tenderness  Skin: No rashes or lesions  Psychiatric: No anxiety or depressed mood    Objective:     Visit Vitals  BP (!) 149/94   Pulse 95   Temp 97.7 °F (36.5 °C) (Oral)   Resp 18   Ht 5' 9\" (1.753 m)   Wt 163 lb (73.9 kg)   SpO2 97%   BMI 24.07 kg/m²        Physical Exam:  General: No acute distress, conversant  Eyes: PERRLA, no scleral icterus  HENT: Normocephalic without oral lesions  Neck: Trachea midline without LAD  Cardiac: Normal pulse rate and rhythm  Pulmonary: Symmetric chest rise with normal effort  GI: Large, indirect RIGHT inguinal hernia that extends down into the scrotum. Hernia is reducible and non-tender. Skin: Warm without rash  Extremities: No edema or joint stiffness  Psych: Appropriate mood and affect    Labs: No results found for this or any previous visit (from the past 24 hour(s)). Data Review: All previous imaging, testing and lab work was reviewed and interpreted. Assessment and Plan:       ICD-10-CM ICD-9-CM    1. Right inguinal hernia  K40.90 550.90        Discussed their diagnosis thoroughly. Noted that the presence of a hernia is not a determining factor when considering surgical repair. Due to the natural progression of a hernia, this will not heal on its own and may continue to increase in size over time. Since this hernia is bothersome, recommend surgical repair as an outpatient, with possible mesh placement. Described the details of this surgery and discussed what the patient should expect for recovery. Pt should avoid any heavy lifting for 10-14 days post-operation. All questions were answered. They agree with this plan and will schedule this at their convenience. The patient was counseled at length about the risks of joshua Covid-19 during their perioperative period and any recovery window from their procedure.   The patient was made aware that joshua Covid-19  may worsen their prognosis for recovering from their procedure and lend to a higher morbidity and/or mortality risk. All material risks, benefits, and reasonable alternatives including postponing the procedure were discussed. The patient does  wish to proceed with the procedure at this time. Total face to face time with patient: 15 minutes. Greater than 50% of the time was spent in counseling. This document was scribed by Iftikhar Peterson as dictated by Dr. Nella Moreira.      Signed By: Jevon Abraham MD     05/10/21

## 2021-05-21 ENCOUNTER — HOSPITAL ENCOUNTER (OUTPATIENT)
Dept: PREADMISSION TESTING | Age: 81
Discharge: HOME | End: 2021-05-21
Payer: MEDICARE

## 2021-05-21 ENCOUNTER — TRANSCRIBE ORDER (OUTPATIENT)
Dept: REGISTRATION | Age: 81
End: 2021-05-21

## 2021-05-21 VITALS
TEMPERATURE: 97.9 F | WEIGHT: 163.8 LBS | RESPIRATION RATE: 18 BRPM | SYSTOLIC BLOOD PRESSURE: 157 MMHG | HEART RATE: 80 BPM | BODY MASS INDEX: 24.26 KG/M2 | DIASTOLIC BLOOD PRESSURE: 83 MMHG | HEIGHT: 69 IN

## 2021-05-21 DIAGNOSIS — Z01.812 PRE-PROCEDURE LAB EXAM: Primary | ICD-10-CM

## 2021-05-21 DIAGNOSIS — Z01.812 PRE-PROCEDURE LAB EXAM: ICD-10-CM

## 2021-05-21 LAB
ANION GAP SERPL CALC-SCNC: 6 MMOL/L (ref 5–15)
ATRIAL RATE: 74 BPM
BASOPHILS # BLD: 0.1 K/UL (ref 0–0.1)
BASOPHILS NFR BLD: 1 % (ref 0–1)
BUN SERPL-MCNC: 20 MG/DL (ref 6–20)
BUN/CREAT SERPL: 17 (ref 12–20)
CALCIUM SERPL-MCNC: 8.7 MG/DL (ref 8.5–10.1)
CALCULATED P AXIS, ECG09: 10 DEGREES
CALCULATED R AXIS, ECG10: -26 DEGREES
CALCULATED T AXIS, ECG11: 21 DEGREES
CHLORIDE SERPL-SCNC: 107 MMOL/L (ref 97–108)
CO2 SERPL-SCNC: 29 MMOL/L (ref 21–32)
CREAT SERPL-MCNC: 1.18 MG/DL (ref 0.7–1.3)
DIAGNOSIS, 93000: NORMAL
DIFFERENTIAL METHOD BLD: NORMAL
EOSINOPHIL # BLD: 0.1 K/UL (ref 0–0.4)
EOSINOPHIL NFR BLD: 2 % (ref 0–7)
ERYTHROCYTE [DISTWIDTH] IN BLOOD BY AUTOMATED COUNT: 12.9 % (ref 11.5–14.5)
GLUCOSE SERPL-MCNC: 95 MG/DL (ref 65–100)
HCT VFR BLD AUTO: 42.8 % (ref 36.6–50.3)
HGB BLD-MCNC: 14.4 G/DL (ref 12.1–17)
IMM GRANULOCYTES # BLD AUTO: 0 K/UL (ref 0–0.04)
IMM GRANULOCYTES NFR BLD AUTO: 0 % (ref 0–0.5)
LYMPHOCYTES # BLD: 1.9 K/UL (ref 0.8–3.5)
LYMPHOCYTES NFR BLD: 34 % (ref 12–49)
MCH RBC QN AUTO: 29.7 PG (ref 26–34)
MCHC RBC AUTO-ENTMCNC: 33.6 G/DL (ref 30–36.5)
MCV RBC AUTO: 88.2 FL (ref 80–99)
MONOCYTES # BLD: 0.5 K/UL (ref 0–1)
MONOCYTES NFR BLD: 8 % (ref 5–13)
NEUTS SEG # BLD: 3.1 K/UL (ref 1.8–8)
NEUTS SEG NFR BLD: 55 % (ref 32–75)
NRBC # BLD: 0 K/UL (ref 0–0.01)
NRBC BLD-RTO: 0 PER 100 WBC
P-R INTERVAL, ECG05: 156 MS
PLATELET # BLD AUTO: 281 K/UL (ref 150–400)
PMV BLD AUTO: 10.4 FL (ref 8.9–12.9)
POTASSIUM SERPL-SCNC: 4.1 MMOL/L (ref 3.5–5.1)
Q-T INTERVAL, ECG07: 402 MS
QRS DURATION, ECG06: 86 MS
QTC CALCULATION (BEZET), ECG08: 446 MS
RBC # BLD AUTO: 4.85 M/UL (ref 4.1–5.7)
SODIUM SERPL-SCNC: 142 MMOL/L (ref 136–145)
VENTRICULAR RATE, ECG03: 74 BPM
WBC # BLD AUTO: 5.6 K/UL (ref 4.1–11.1)

## 2021-05-21 PROCEDURE — 80048 BASIC METABOLIC PNL TOTAL CA: CPT

## 2021-05-21 PROCEDURE — 85025 COMPLETE CBC W/AUTO DIFF WBC: CPT

## 2021-05-21 PROCEDURE — 93005 ELECTROCARDIOGRAM TRACING: CPT

## 2021-05-21 PROCEDURE — 36415 COLL VENOUS BLD VENIPUNCTURE: CPT

## 2021-05-21 PROCEDURE — U0003 INFECTIOUS AGENT DETECTION BY NUCLEIC ACID (DNA OR RNA); SEVERE ACUTE RESPIRATORY SYNDROME CORONAVIRUS 2 (SARS-COV-2) (CORONAVIRUS DISEASE [COVID-19]), AMPLIFIED PROBE TECHNIQUE, MAKING USE OF HIGH THROUGHPUT TECHNOLOGIES AS DESCRIBED BY CMS-2020-01-R: HCPCS

## 2021-05-21 RX ORDER — GUAIFENESIN 100 MG/5ML
81 LIQUID (ML) ORAL
COMMUNITY

## 2021-05-21 NOTE — PERIOP NOTES
Preoperative and medication instructions reviewed with patient , surgical site infection sheet given,  chg soap x 2 given and instructions on how to use chg soap correctly. Patient given opportunity to ask questions and all questions were answered. Information given regarding covid testing and arrival to hospital on day of surgery.

## 2021-05-22 LAB — SARS-COV-2, COV2NT: NOT DETECTED

## 2021-05-27 ENCOUNTER — ANESTHESIA EVENT (OUTPATIENT)
Dept: SURGERY | Age: 81
End: 2021-05-27
Payer: MEDICARE

## 2021-05-27 ENCOUNTER — ANESTHESIA (OUTPATIENT)
Dept: SURGERY | Age: 81
End: 2021-05-27
Payer: MEDICARE

## 2021-05-27 ENCOUNTER — HOSPITAL ENCOUNTER (OUTPATIENT)
Age: 81
Setting detail: OUTPATIENT SURGERY
Discharge: HOME | End: 2021-05-27
Attending: SURGERY | Admitting: SURGERY
Payer: MEDICARE

## 2021-05-27 VITALS
HEART RATE: 95 BPM | TEMPERATURE: 97.7 F | DIASTOLIC BLOOD PRESSURE: 71 MMHG | SYSTOLIC BLOOD PRESSURE: 140 MMHG | RESPIRATION RATE: 19 BRPM | OXYGEN SATURATION: 99 %

## 2021-05-27 DIAGNOSIS — K40.90 RIGHT INGUINAL HERNIA: ICD-10-CM

## 2021-05-27 DIAGNOSIS — G89.18 POST-OP PAIN: Primary | ICD-10-CM

## 2021-05-27 PROCEDURE — 77030008684 HC TU ET CUF COVD -B: Performed by: ANESTHESIOLOGY

## 2021-05-27 PROCEDURE — C1781 MESH (IMPLANTABLE): HCPCS | Performed by: SURGERY

## 2021-05-27 PROCEDURE — 77030031139 HC SUT VCRL2 J&J -A: Performed by: SURGERY

## 2021-05-27 PROCEDURE — 74011000250 HC RX REV CODE- 250: Performed by: NURSE ANESTHETIST, CERTIFIED REGISTERED

## 2021-05-27 PROCEDURE — C9290 INJ, BUPIVACAINE LIPOSOME: HCPCS | Performed by: SURGERY

## 2021-05-27 PROCEDURE — 74011250636 HC RX REV CODE- 250/636: Performed by: NURSE ANESTHETIST, CERTIFIED REGISTERED

## 2021-05-27 PROCEDURE — 88302 TISSUE EXAM BY PATHOLOGIST: CPT

## 2021-05-27 PROCEDURE — 49505 PRP I/HERN INIT REDUC >5 YR: CPT | Performed by: SURGERY

## 2021-05-27 PROCEDURE — 76210000006 HC OR PH I REC 0.5 TO 1 HR: Performed by: SURGERY

## 2021-05-27 PROCEDURE — 76060000034 HC ANESTHESIA 1.5 TO 2 HR: Performed by: SURGERY

## 2021-05-27 PROCEDURE — 77030042556 HC PNCL CAUT -B: Performed by: SURGERY

## 2021-05-27 PROCEDURE — 2709999900 HC NON-CHARGEABLE SUPPLY: Performed by: SURGERY

## 2021-05-27 PROCEDURE — 77030040361 HC SLV COMPR DVT MDII -B: Performed by: SURGERY

## 2021-05-27 PROCEDURE — 74011000258 HC RX REV CODE- 258: Performed by: SURGERY

## 2021-05-27 PROCEDURE — 88304 TISSUE EXAM BY PATHOLOGIST: CPT

## 2021-05-27 PROCEDURE — 76210000020 HC REC RM PH II FIRST 0.5 HR: Performed by: SURGERY

## 2021-05-27 PROCEDURE — 77030026438 HC STYL ET INTUB CARD -A: Performed by: ANESTHESIOLOGY

## 2021-05-27 PROCEDURE — 76010000153 HC OR TIME 1.5 TO 2 HR: Performed by: SURGERY

## 2021-05-27 PROCEDURE — 74011000250 HC RX REV CODE- 250: Performed by: SURGERY

## 2021-05-27 PROCEDURE — 77030002982 HC SUT POLYSRB J&J -A: Performed by: SURGERY

## 2021-05-27 PROCEDURE — 77030002933 HC SUT MCRYL J&J -A: Performed by: SURGERY

## 2021-05-27 PROCEDURE — 74011250636 HC RX REV CODE- 250/636: Performed by: SURGERY

## 2021-05-27 PROCEDURE — 77030010507 HC ADH SKN DERMBND J&J -B: Performed by: SURGERY

## 2021-05-27 DEVICE — PERFIX PLUG, 1.6" X 1.9" (4.1 CM X 4.8 CM), LARGE (CONTENTS: 2)
Type: IMPLANTABLE DEVICE | Site: GROIN | Status: FUNCTIONAL
Brand: PERFIX

## 2021-05-27 RX ORDER — DEXMEDETOMIDINE HYDROCHLORIDE 100 UG/ML
INJECTION, SOLUTION INTRAVENOUS AS NEEDED
Status: DISCONTINUED | OUTPATIENT
Start: 2021-05-27 | End: 2021-05-27 | Stop reason: HOSPADM

## 2021-05-27 RX ORDER — NEOSTIGMINE METHYLSULFATE 1 MG/ML
INJECTION INTRAVENOUS AS NEEDED
Status: DISCONTINUED | OUTPATIENT
Start: 2021-05-27 | End: 2021-05-27 | Stop reason: HOSPADM

## 2021-05-27 RX ORDER — ACETAMINOPHEN 325 MG/1
650 TABLET ORAL ONCE
Status: DISCONTINUED | OUTPATIENT
Start: 2021-05-27 | End: 2021-05-27 | Stop reason: HOSPADM

## 2021-05-27 RX ORDER — MORPHINE SULFATE 2 MG/ML
2 INJECTION, SOLUTION INTRAMUSCULAR; INTRAVENOUS
Status: DISCONTINUED | OUTPATIENT
Start: 2021-05-27 | End: 2021-05-27 | Stop reason: HOSPADM

## 2021-05-27 RX ORDER — MIDAZOLAM HYDROCHLORIDE 1 MG/ML
INJECTION, SOLUTION INTRAMUSCULAR; INTRAVENOUS AS NEEDED
Status: DISCONTINUED | OUTPATIENT
Start: 2021-05-27 | End: 2021-05-27 | Stop reason: HOSPADM

## 2021-05-27 RX ORDER — CEFAZOLIN SODIUM 1 G/3ML
INJECTION, POWDER, FOR SOLUTION INTRAMUSCULAR; INTRAVENOUS AS NEEDED
Status: DISCONTINUED | OUTPATIENT
Start: 2021-05-27 | End: 2021-05-27 | Stop reason: HOSPADM

## 2021-05-27 RX ORDER — BUPIVACAINE HYDROCHLORIDE AND EPINEPHRINE 5; 5 MG/ML; UG/ML
30 INJECTION, SOLUTION EPIDURAL; INTRACAUDAL; PERINEURAL ONCE
Status: DISCONTINUED | OUTPATIENT
Start: 2021-05-27 | End: 2021-05-27 | Stop reason: HOSPADM

## 2021-05-27 RX ORDER — SODIUM CHLORIDE 0.9 % (FLUSH) 0.9 %
5-40 SYRINGE (ML) INJECTION AS NEEDED
Status: DISCONTINUED | OUTPATIENT
Start: 2021-05-27 | End: 2021-05-27 | Stop reason: HOSPADM

## 2021-05-27 RX ORDER — ONDANSETRON 2 MG/ML
INJECTION INTRAMUSCULAR; INTRAVENOUS AS NEEDED
Status: DISCONTINUED | OUTPATIENT
Start: 2021-05-27 | End: 2021-05-27 | Stop reason: HOSPADM

## 2021-05-27 RX ORDER — EPHEDRINE SULFATE/0.9% NACL/PF 50 MG/5 ML
SYRINGE (ML) INTRAVENOUS AS NEEDED
Status: DISCONTINUED | OUTPATIENT
Start: 2021-05-27 | End: 2021-05-27 | Stop reason: HOSPADM

## 2021-05-27 RX ORDER — SODIUM CHLORIDE, SODIUM LACTATE, POTASSIUM CHLORIDE, CALCIUM CHLORIDE 600; 310; 30; 20 MG/100ML; MG/100ML; MG/100ML; MG/100ML
INJECTION, SOLUTION INTRAVENOUS
Status: DISCONTINUED | OUTPATIENT
Start: 2021-05-27 | End: 2021-05-27 | Stop reason: HOSPADM

## 2021-05-27 RX ORDER — SODIUM CHLORIDE 0.9 % (FLUSH) 0.9 %
5-40 SYRINGE (ML) INJECTION EVERY 8 HOURS
Status: DISCONTINUED | OUTPATIENT
Start: 2021-05-27 | End: 2021-05-27 | Stop reason: HOSPADM

## 2021-05-27 RX ORDER — FENTANYL CITRATE 50 UG/ML
50 INJECTION, SOLUTION INTRAMUSCULAR; INTRAVENOUS AS NEEDED
Status: DISCONTINUED | OUTPATIENT
Start: 2021-05-27 | End: 2021-05-27 | Stop reason: HOSPADM

## 2021-05-27 RX ORDER — ONDANSETRON 2 MG/ML
4 INJECTION INTRAMUSCULAR; INTRAVENOUS AS NEEDED
Status: DISCONTINUED | OUTPATIENT
Start: 2021-05-27 | End: 2021-05-27 | Stop reason: HOSPADM

## 2021-05-27 RX ORDER — SODIUM CHLORIDE, SODIUM LACTATE, POTASSIUM CHLORIDE, CALCIUM CHLORIDE 600; 310; 30; 20 MG/100ML; MG/100ML; MG/100ML; MG/100ML
125 INJECTION, SOLUTION INTRAVENOUS CONTINUOUS
Status: DISCONTINUED | OUTPATIENT
Start: 2021-05-27 | End: 2021-05-27 | Stop reason: HOSPADM

## 2021-05-27 RX ORDER — MIDAZOLAM HYDROCHLORIDE 1 MG/ML
1 INJECTION, SOLUTION INTRAMUSCULAR; INTRAVENOUS AS NEEDED
Status: DISCONTINUED | OUTPATIENT
Start: 2021-05-27 | End: 2021-05-27 | Stop reason: HOSPADM

## 2021-05-27 RX ORDER — PHENYLEPHRINE HCL IN 0.9% NACL 0.4MG/10ML
SYRINGE (ML) INTRAVENOUS AS NEEDED
Status: DISCONTINUED | OUTPATIENT
Start: 2021-05-27 | End: 2021-05-27 | Stop reason: HOSPADM

## 2021-05-27 RX ORDER — SODIUM CHLORIDE 9 MG/ML
50 INJECTION, SOLUTION INTRAVENOUS CONTINUOUS
Status: DISCONTINUED | OUTPATIENT
Start: 2021-05-27 | End: 2021-05-27 | Stop reason: HOSPADM

## 2021-05-27 RX ORDER — DEXAMETHASONE SODIUM PHOSPHATE 4 MG/ML
INJECTION, SOLUTION INTRA-ARTICULAR; INTRALESIONAL; INTRAMUSCULAR; INTRAVENOUS; SOFT TISSUE AS NEEDED
Status: DISCONTINUED | OUTPATIENT
Start: 2021-05-27 | End: 2021-05-27 | Stop reason: HOSPADM

## 2021-05-27 RX ORDER — DIPHENHYDRAMINE HYDROCHLORIDE 50 MG/ML
12.5 INJECTION, SOLUTION INTRAMUSCULAR; INTRAVENOUS AS NEEDED
Status: DISCONTINUED | OUTPATIENT
Start: 2021-05-27 | End: 2021-05-27 | Stop reason: HOSPADM

## 2021-05-27 RX ORDER — FENTANYL CITRATE 50 UG/ML
INJECTION, SOLUTION INTRAMUSCULAR; INTRAVENOUS AS NEEDED
Status: DISCONTINUED | OUTPATIENT
Start: 2021-05-27 | End: 2021-05-27 | Stop reason: HOSPADM

## 2021-05-27 RX ORDER — LIDOCAINE HYDROCHLORIDE 10 MG/ML
0.1 INJECTION, SOLUTION EPIDURAL; INFILTRATION; INTRACAUDAL; PERINEURAL AS NEEDED
Status: DISCONTINUED | OUTPATIENT
Start: 2021-05-27 | End: 2021-05-27 | Stop reason: HOSPADM

## 2021-05-27 RX ORDER — BUPIVACAINE HYDROCHLORIDE AND EPINEPHRINE 5; 5 MG/ML; UG/ML
INJECTION, SOLUTION EPIDURAL; INTRACAUDAL; PERINEURAL AS NEEDED
Status: DISCONTINUED | OUTPATIENT
Start: 2021-05-27 | End: 2021-05-27 | Stop reason: HOSPADM

## 2021-05-27 RX ORDER — HYDROMORPHONE HYDROCHLORIDE 1 MG/ML
0.2 INJECTION, SOLUTION INTRAMUSCULAR; INTRAVENOUS; SUBCUTANEOUS
Status: DISCONTINUED | OUTPATIENT
Start: 2021-05-27 | End: 2021-05-27 | Stop reason: HOSPADM

## 2021-05-27 RX ORDER — OXYCODONE AND ACETAMINOPHEN 5; 325 MG/1; MG/1
1 TABLET ORAL AS NEEDED
Status: DISCONTINUED | OUTPATIENT
Start: 2021-05-27 | End: 2021-05-27 | Stop reason: HOSPADM

## 2021-05-27 RX ORDER — FENTANYL CITRATE 50 UG/ML
25 INJECTION, SOLUTION INTRAMUSCULAR; INTRAVENOUS
Status: DISCONTINUED | OUTPATIENT
Start: 2021-05-27 | End: 2021-05-27 | Stop reason: HOSPADM

## 2021-05-27 RX ORDER — SODIUM CHLORIDE 9 MG/ML
1000 INJECTION, SOLUTION INTRAVENOUS CONTINUOUS
Status: DISCONTINUED | OUTPATIENT
Start: 2021-05-27 | End: 2021-05-27 | Stop reason: HOSPADM

## 2021-05-27 RX ORDER — GLYCOPYRROLATE 0.2 MG/ML
INJECTION INTRAMUSCULAR; INTRAVENOUS AS NEEDED
Status: DISCONTINUED | OUTPATIENT
Start: 2021-05-27 | End: 2021-05-27 | Stop reason: HOSPADM

## 2021-05-27 RX ORDER — SUCCINYLCHOLINE CHLORIDE 20 MG/ML
INJECTION INTRAMUSCULAR; INTRAVENOUS AS NEEDED
Status: DISCONTINUED | OUTPATIENT
Start: 2021-05-27 | End: 2021-05-27 | Stop reason: HOSPADM

## 2021-05-27 RX ORDER — MIDAZOLAM HYDROCHLORIDE 1 MG/ML
0.5 INJECTION, SOLUTION INTRAMUSCULAR; INTRAVENOUS
Status: DISCONTINUED | OUTPATIENT
Start: 2021-05-27 | End: 2021-05-27 | Stop reason: HOSPADM

## 2021-05-27 RX ORDER — PROPOFOL 10 MG/ML
INJECTION, EMULSION INTRAVENOUS AS NEEDED
Status: DISCONTINUED | OUTPATIENT
Start: 2021-05-27 | End: 2021-05-27 | Stop reason: HOSPADM

## 2021-05-27 RX ORDER — ROCURONIUM BROMIDE 10 MG/ML
INJECTION, SOLUTION INTRAVENOUS AS NEEDED
Status: DISCONTINUED | OUTPATIENT
Start: 2021-05-27 | End: 2021-05-27 | Stop reason: HOSPADM

## 2021-05-27 RX ORDER — EPHEDRINE SULFATE/0.9% NACL/PF 50 MG/5 ML
5 SYRINGE (ML) INTRAVENOUS AS NEEDED
Status: DISCONTINUED | OUTPATIENT
Start: 2021-05-27 | End: 2021-05-27 | Stop reason: HOSPADM

## 2021-05-27 RX ORDER — HYDROCODONE BITARTRATE AND ACETAMINOPHEN 5; 325 MG/1; MG/1
1 TABLET ORAL
Qty: 12 TABLET | Refills: 0 | Status: SHIPPED | OUTPATIENT
Start: 2021-05-27 | End: 2021-05-30

## 2021-05-27 RX ORDER — LIDOCAINE HYDROCHLORIDE 20 MG/ML
INJECTION, SOLUTION EPIDURAL; INFILTRATION; INTRACAUDAL; PERINEURAL AS NEEDED
Status: DISCONTINUED | OUTPATIENT
Start: 2021-05-27 | End: 2021-05-27 | Stop reason: HOSPADM

## 2021-05-27 RX ADMIN — ROCURONIUM BROMIDE 5 MG: 10 SOLUTION INTRAVENOUS at 08:09

## 2021-05-27 RX ADMIN — DEXMEDETOMIDINE HYDROCHLORIDE 5 MCG: 100 INJECTION, SOLUTION, CONCENTRATE INTRAVENOUS at 09:10

## 2021-05-27 RX ADMIN — FENTANYL CITRATE 50 MCG: 50 INJECTION, SOLUTION INTRAMUSCULAR; INTRAVENOUS at 09:32

## 2021-05-27 RX ADMIN — NEOSTIGMINE METHYLSULFATE 2 MG: 1 INJECTION, SOLUTION INTRAVENOUS at 09:10

## 2021-05-27 RX ADMIN — Medication 10 MG: at 08:38

## 2021-05-27 RX ADMIN — ROCURONIUM BROMIDE 35 MG: 10 SOLUTION INTRAVENOUS at 08:13

## 2021-05-27 RX ADMIN — MIDAZOLAM 1 MG: 1 INJECTION INTRAMUSCULAR; INTRAVENOUS at 07:57

## 2021-05-27 RX ADMIN — ONDANSETRON HYDROCHLORIDE 4 MG: 2 INJECTION, SOLUTION INTRAMUSCULAR; INTRAVENOUS at 09:10

## 2021-05-27 RX ADMIN — FENTANYL CITRATE 50 MCG: 50 INJECTION, SOLUTION INTRAMUSCULAR; INTRAVENOUS at 08:08

## 2021-05-27 RX ADMIN — GLYCOPYRROLATE 0.4 MG: 0.2 INJECTION, SOLUTION INTRAMUSCULAR; INTRAVENOUS at 09:10

## 2021-05-27 RX ADMIN — CEFAZOLIN 2 G: 330 INJECTION, POWDER, FOR SOLUTION INTRAMUSCULAR; INTRAVENOUS at 08:20

## 2021-05-27 RX ADMIN — DEXAMETHASONE SODIUM PHOSPHATE 4 MG: 4 INJECTION, SOLUTION INTRAMUSCULAR; INTRAVENOUS at 08:56

## 2021-05-27 RX ADMIN — SUCCINYLCHOLINE CHLORIDE 120 MG: 20 INJECTION, SOLUTION INTRAMUSCULAR; INTRAVENOUS at 08:09

## 2021-05-27 RX ADMIN — Medication 80 MCG: at 08:32

## 2021-05-27 RX ADMIN — SODIUM CHLORIDE, POTASSIUM CHLORIDE, SODIUM LACTATE AND CALCIUM CHLORIDE: 600; 310; 30; 20 INJECTION, SOLUTION INTRAVENOUS at 07:30

## 2021-05-27 RX ADMIN — LIDOCAINE HYDROCHLORIDE 80 MG: 20 INJECTION, SOLUTION EPIDURAL; INFILTRATION; INTRACAUDAL; PERINEURAL at 08:08

## 2021-05-27 RX ADMIN — PROPOFOL 150 MG: 10 INJECTION, EMULSION INTRAVENOUS at 08:09

## 2021-05-27 RX ADMIN — Medication 120 MCG: at 08:25

## 2021-05-27 RX ADMIN — Medication 10 MG: at 08:56

## 2021-05-27 NOTE — BRIEF OP NOTE
Brief Postoperative Note    Patient: Rajesh Stone  YOB: 1940  MRN: [id number]    Date of Procedure: 5/27/2021     Pre-Op Diagnosis: RIGHT INGUINAL HERNIA    Post-Op Diagnosis: Same as preoperative diagnosis. Procedure(s):  REPAIR RIGHT INGUINAL HERNIA    Surgeon(s):  Randall Goncalves MD    Surgical Assistant: Surg Asst-1: Steve Birch    Anesthesia: General     Estimated Blood Loss (mL): Minimal    Complications: None    Specimens:   ID Type Source Tests Collected by Time Destination   1 : Hernia Sack Fresh Hernia Sac  Randall Goncalves MD 5/27/2021 1529 Pathology   2 : Lipoma of the cord Fresh Groin  Randall Goncalves MD 5/27/2021 7228 Pathology        Implants:   Implant Name Type Inv. Item Serial No.  Lot No. LRB No. Used Action   MESH LOREN PLG LG 1.6X1. 9IN --  - SNA  MESH LOREN PLG LG 1.6X1. 9IN --  NA BARD DAVOL_WD [id number] Right 1 Implanted       Drains: * No LDAs found *    Findings: large indirect hernia    Electronically Signed by Micheline Bautista MD on 5/27/2021 at 9:12 AM  [id number]

## 2021-05-27 NOTE — DISCHARGE INSTRUCTIONS
Hernia Repair: What to Expect at [address] are likely to have pain for the next few days. You may also feel tired and have less energy than normal. This is common. You should feel better after a few days and will probably feel much better in 7 days. For several weeks you may feel discomfort or pulling in the hernia repair when you move. You may have some bruising near the repair site and on your genitals. This is normal. If you have swelling in the genitals, you may be told to wear well-fitting briefs. WIDIP bicycle shorts may also provide good support. This care sheet gives you a general idea about how long it will take for you to recover. But each person recovers at a different pace. Follow the steps below to get better as quickly as possible. How can you care for yourself at home? Activity    · Rest when you feel tired. Getting enough sleep will help you recover.     · Try to walk each day. Start by walking a little more than you did the day before. Bit by bit, increase the amount you walk. Walking boosts blood flow and helps prevent pneumonia and constipation.     · Avoid strenuous activities, such as biking, jogging, weight lifting, or aerobic exercise, until your doctor says it is okay.     · Avoid lifting anything that would make you strain. This may include heavy grocery bags and milk containers, a heavy briefcase or backpack, cat litter or dog food bags, a vacuum , or a child.     · You may drive when you are no longer taking pain medicine and can quickly move your foot from the gas pedal to the brake. You must also be able to sit comfortably for a long period of time, even if you do not plan to go far. You might get caught in traffic.     · Most people are able to return to work within 1 to 2 weeks after surgery.     · You may shower 24 to 48 hours after surgery, if your doctor okays it. Pat the cut (incision) dry.  Do not take a bath for the first 2 weeks, or until your doctor tells you it is okay.     · Your doctor will tell you when you can have sex again. Diet    · You can eat your normal diet. If your stomach is upset, try bland, low-fat foods like plain rice, broiled chicken, toast, and yogurt.     · Drink plenty of fluids (unless your doctor tells you not to).     · You may notice that your bowel movements are not regular right after your surgery. This is common. Avoid constipation and straining with bowel movements. You may want to take a fiber supplement every day. If you have not had a bowel movement after a couple of days, ask your doctor about taking a mild laxative. Medicines    · Your doctor will tell you if and when you can restart your medicines. He or she will also give you instructions about taking any new medicines.     · If you take aspirin or some other blood thinner, ask your doctor if and when to start taking it again. Make sure that you understand exactly what your doctor wants you to do.     · Be safe with medicines. Take pain medicines exactly as directed. ? If the doctor gave you a prescription medicine for pain, take it as prescribed. ? If you are not taking a prescription pain medicine, take an over-the-counter medicine such as acetaminophen (Tylenol), ibuprofen (Advil, Motrin), or naproxen (Aleve). Read and follow all instructions on the label. ? Do not take two or more pain medicines at the same time unless the doctor told you to. Many pain medicines have acetaminophen, which is Tylenol. Too much acetaminophen (Tylenol) can be harmful.     · If your doctor prescribed antibiotics, take them as directed. Do not stop taking them just because you feel better. You need to take the full course of antibiotics.     · If you think your pain medicine is making you sick to your stomach:  ? Take your medicine after meals (unless your doctor has told you not to). ? Ask your doctor for a different pain medicine.    Incision care    · If you have strips of tape on the cut (incision) the doctor made, leave the tape on for a week or until it falls off.     · If you have staples closing the cut, you will need to visit your doctor in 1 to 2 weeks to have them removed.     · Wash the area daily with warm, soapy water and pat it dry. Follow-up care is a key part of your treatment and safety. Be sure to make and go to all appointments, and call your doctor if you are having problems. It's also a good idea to know your test results and keep a list of the medicines you take. When should you call for help? Call 911 anytime you think you may need emergency care. For example, call if:    · You passed out (lost consciousness).     · You are short of breath. Call your doctor now or seek immediate medical care if:    · You have pain that does not get better after you take pain medicine.     · You are sick to your stomach and cannot keep fluids down.     · You have signs of a blood clot in your leg (called a deep vein thrombosis), such as:  ? Pain in your calf, back of the knee, thigh, or groin. ? Redness and swelling in your leg or groin.     · You cannot pass stools or gas.     · Bright red blood has soaked through the bandage over your incision.     · You have loose stitches, or your incision comes open.     · You have signs of infection, such as:  ? Increased pain, swelling, warmth, or redness. ? Red streaks leading from the incision. ? Pus draining from the incision. ? A fever. Watch closely for any changes in your health, and be sure to contact your doctor if you have any problems. Where can you learn more? Go to http://www.gray.com/  Enter U839 in the search box to learn more about \"Hernia Repair: What to Expect at Home. \"  Current as of: April 15, 2020               Content Version: 12.8  © 4175-5808 [employer], Incorporated.    Care instructions adapted under license by [employer] (which disclaims liability or warranty for this information). If you have questions about a medical condition or this instruction, always ask your healthcare professional. [place] [address] any warranty or liability for your use of this information. Patient Discharge Instructions    Lawanda Siemens / [id number] : 1940    Admitted 2021 Discharged: 2021     Take Home Medications      · It is important that you take the medication exactly as they are prescribed. · Keep your medication in the bottles provided by the pharmacist and keep a list of the medication names, dosages, and times to be taken in your wallet. · Do not take other medications without consulting your doctor. What to do at Home    Recommended diet: Regular Diet, start with light, bland foods and advance if no nausea or vomiting    Recommended activity: Activity as tolerated, may shower whenever you wish      Follow-up Appointments   Procedures    FOLLOW UP VISIT Appointment in: Two Weeks     Standing Status:   Standing     Number of Occurrences:   1     Order Specific Question:   Appointment in     Answer: Two Weeks       Information obtained by :  I understand that if any problems occur once I am at home I am to contact my physician. I understand and acknowledge receipt of the instructions indicated above.                                                                                                                                            Physician's or R.N.'s Signature                                                                  Date/Time                                                                                                                                              Patient or Representative Signature                                                          Date/Time    ______________________________________________________________________    Anesthesia Discharge Instructions    After general anesthesia or intervenous sedation, for 24 hours or while taking prescription Narcotics:  · Limit your activities  · Do not drive or operate hazardous machinery  · If you have not urinated within 8 hours after discharge, please contact your surgeon on call. · Do not make important personal or business decisions  · Do not drink alcoholic beverages    Report the following to your surgeon:  · Excessive pain, swelling, redness or odor of or around the surgical area  · Temperature over 100.5 degrees  · Nausea and vomiting lasting longer than 4 hours or if unable to take medication  · Any signs of decreased circulation or nerve impairment to extremity:  Change in color, persistent numbness, tingling, coldness or increased pain.   · Any questions

## 2021-05-27 NOTE — ANESTHESIA POSTPROCEDURE EVALUATION
Procedure(s):  REPAIR RIGHT INGUINAL HERNIA.    general    Anesthesia Post Evaluation        Patient location during evaluation: PACU  Note status: Adequate. Level of consciousness: responsive to verbal stimuli and sleepy but conscious  Pain management: satisfactory to patient  Airway patency: patent  Anesthetic complications: no  Cardiovascular status: acceptable  Respiratory status: acceptable  Hydration status: acceptable  Comments: +Post-Anesthesia Evaluation and Assessment    Patient: Prasad Crocker MRN: [id number]  SSN: [id number]   YOB: 1940  Age: 78 y.o. Sex: male          Cardiovascular Function/Vital Signs    BP (!) 140/71   Pulse 95   Temp 36.5 °C (97.7 °F)   Resp 19   SpO2 99%     Patient is status post Procedure(s):  REPAIR RIGHT INGUINAL HERNIA. Nausea/Vomiting: Controlled. Postoperative hydration reviewed and adequate. Pain:  Pain Scale 1: Numeric (0 - 10) (05/27/21 1007)  Pain Intensity 1: 0 (05/27/21 1007)   Managed. Neurological Status:   Neuro (WDL): Within Defined Limits (05/27/21 1007)   At baseline. Mental Status and Level of Consciousness: Arousable. Pulmonary Status:   O2 Device: None (Room air) (05/27/21 1040)   Adequate oxygenation and airway patent. Complications related to anesthesia: None    Post-anesthesia assessment completed. No concerns. I have evaluated the patient and the patient is stable and ready to be discharged from PACU . Signed By: Ney Rankin MD    5/27/2021        INITIAL Post-op Vital signs:   Vitals Value Taken Time   /92 05/27/21 1015   Temp 36.4 °C (97.5 °F) 05/27/21 1007   Pulse 77 05/27/21 1020   Resp 17 05/27/21 1020   SpO2 94 % 05/27/21 1020   Vitals shown include unvalidated device data.

## 2021-05-27 NOTE — ROUTINE PROCESS
Patient: Edward Stapleton MRN: [id number]  SSN: [id number] YOB: 1940  Age: 78 y.o. Sex: male Patient is status post Procedure(s): 
REPAIR RIGHT INGUINAL HERNIA. Surgeon(s) and Role: Sujata Brink MD - Primary Local/Dose/Irrigation:  0.69% normal saline used for irrigation Peripheral IV 05/27/21 Left Antecubital (Active) Site Assessment Clean, dry, & intact 05/27/21 0744 Phlebitis Assessment 0 05/27/21 0744 Infiltration Assessment 0 05/27/21 0744 Dressing Status Clean, dry, & intact 05/27/21 0744 Hub Color/Line Status Pink 05/27/21 0744 Alcohol Cap Used Yes 05/27/21 0744 Airway - Endotracheal Tube Oral (Active) Dressing/Packing:  Incision 05/27/21 Groin Right-Dressing/Treatment: Skin glue (sutures inside_Dermabond on skin) (05/27/21 0855) Splint/Cast:  ] Other:  SCDs

## 2021-05-27 NOTE — INTERVAL H&P NOTE
Update History & Physical 
 
The Patient's History and Physical of May 10,  
2021 was reviewed with the patient and I examined the patient. There was no change. The surgical site was confirmed by the patient and me. Plan:  The risk, benefits, expected outcome, and alternative to the recommended procedure have been discussed with the patient. Patient understands and wants to proceed with the procedure.  
 
Electronically signed by Micheline Bautista MD on 5/27/2021 at 6:54 AM

## 2021-05-27 NOTE — OP NOTES
[address]   OPERATIVE REPORT    Name:  Lucero Vu  MR#:  [id number]  :  1940  ACCOUNT #:  [de-identified]  DATE OF SERVICE:  2021      PREOPERATIVE DIAGNOSIS:  Right inguinal hernia. POSTOPERATIVE DIAGNOSIS:  Right inguinal hernia. PROCEDURE PERFORMED:  Repair of right inguinal hernia with plug and patch technique. SURGEON:  Yudy Dennis MD    ASSISTANT:  Ash Snell SA    ANESTHESIA:  General supplemented with 0.5% Marcaine with epinephrine and terminally with 40 mL of 50% Exparel suspension. COMPLICATIONS:  None. SPECIMENS REMOVED:  Hernia sac and a lipoma of the cord. IMPLANTS:  Large plug and patch by Reny. ESTIMATED BLOOD LOSS:  Minimal.    DRAINS:  None. FINDINGS:  A large indirect inguinal hernia. PROCEDURE:  With the patient supine and suitably anesthetized, the abdomen was prepared with ChloraPrep and draped as a field. 0.5% Marcaine with epinephrine was infiltrated in the area around the anterior iliac spine and in the area of the incision. An oblique incision was made and skin and subcutaneous tissues were divided down to the external oblique fascia, which was then opened in the direction of its fibers through the external ring. The inguinal ligament was dissected both inferiorly and superiorly thereby encircling the cord. The cremaster muscle was divided in the direction of its fibers. The sac was identified and grasped and opened. A finger was placed into it and then using that as a guide, I dissected all the surrounding tissues from the sac all the way down into the scrotum and then back up again to the junction with the peritoneum where it was then doubly suture ligated with 2-0 Vicryl and distal portion amputated. A small lipoma that was attached to it was removed just for completeness sake. A large plug was then placed to re-create the internal ring.   A patch was fashioned and secured to the pubic tubercle, shelving edge of Poupart's ligament and the conjoined tendon. The tabs of the patch were approximated lateral to the cord and placed underneath the external oblique fascia. The incision of the fascia was then closed from lateral to medial using a 0 Vicryl suture in a running fashion. At this point, Exparel was infused everywhere except near the femoral nerve. The subcutaneous tissues were approximated with Vicryl and the skin was closed with subcuticular Monocryl followed by Dermabond. At the termination of the procedure, all counts were correct. The patient tolerated this well and was brought to the PACU in satisfactory condition.       Sarah Wise MD      [id number]  D:  05/27/2021 9:33  T:  05/27/2021 15:56  JOB #:  [id number]  CC:  Ruth Rodrigues MD

## 2021-06-03 ENCOUNTER — TELEPHONE (OUTPATIENT)
Dept: SURGERY | Age: 81
End: 2021-06-03

## 2021-06-03 NOTE — TELEPHONE ENCOUNTER
Patient's caregiver Maria Ines Eubanks called and stated that patient is having swelling around his incision site from hernia repair. Caregiver states that there is no redness or drainage.  They would like a call back to see if this is normal.

## 2021-06-03 NOTE — TELEPHONE ENCOUNTER
Returned call to patient. Two patient identifiers used. Patient is 7 days s/p right inguinal hernia repair. Patient stated he has some swelling in the area [facility] of quarter\". Patient stated it feels \"a little hard\". Patient denies erythema, drainage, pain, or warmness to site. Patient further denies SOB, lower extremity pain, or fever. Explained to patient that the swelling should go down soon. Patient understands he can give us a call if he experiences any worse symptoms.

## 2021-06-04 ENCOUNTER — OFFICE VISIT (OUTPATIENT)
Dept: SURGERY | Age: 81
End: 2021-06-04
Payer: MEDICARE

## 2021-06-04 ENCOUNTER — TELEPHONE (OUTPATIENT)
Dept: SURGERY | Age: 81
End: 2021-06-04

## 2021-06-04 VITALS
RESPIRATION RATE: 18 BRPM | WEIGHT: 161.2 LBS | BODY MASS INDEX: 23.88 KG/M2 | HEART RATE: 108 BPM | HEIGHT: 69 IN | OXYGEN SATURATION: 98 % | TEMPERATURE: 98 F

## 2021-06-04 DIAGNOSIS — Z09 POSTOPERATIVE EXAMINATION: Primary | ICD-10-CM

## 2021-06-04 PROBLEM — K40.90 RIGHT INGUINAL HERNIA: Status: RESOLVED | Noted: 2021-05-10 | Resolved: 2021-06-04

## 2021-06-04 PROCEDURE — 99024 POSTOP FOLLOW-UP VISIT: CPT | Performed by: SURGERY

## 2021-06-04 NOTE — TELEPHONE ENCOUNTER
Dr. Eric Horton did review the perfect serve message and recommended that the pt start his BP meds and he can see the pt this afternoon or Monday during reg clinic hours. Pt was called and made aware of the above and he chose to come in this afternoon at 1 pm. Dr. Eric Horton was made aware of the pts choice. Pt to be scheduled per PSR.

## 2021-06-04 NOTE — PROGRESS NOTES
Subjective:      Marcelle Gomes is a 78 y.o. male presents for postop care 8 days following right inguinal hernia repair. Appetite is good. Eating a regular diet without difficulty. Bowel movements are regular. The patient is voiding without difficulty. Pain is controlled without any medications. .    Objective:     Visit Vitals  Pulse (!) 108   Temp 98 °F (36.7 °C) (Oral)   Resp 18   Ht 5' 9\" (1.753 m)   Wt 161 lb 3.2 oz (73.1 kg)   SpO2 98%   BMI 23.81 kg/m²       General:  alert, cooperative, no distress, appears stated age   Abdomen: soft, bowel sounds active, non-tender   Incision:   healing well, no drainage, no erythema, no hernia, no seroma, no swelling, no dehiscence, incision well approximated     Assessment:     Doing well postoperatively. Plan:     1. Continue any current medications. 2. Wound care discussed. 3. Pt is to increase activities as tolerated. Frieda Sethi

## 2021-06-04 NOTE — PROGRESS NOTES
1. Have you been to the ER, urgent care clinic since your last visit? Hospitalized since your last visit? no    2. Have you seen or consulted any other health care providers outside of the [address] [place] [address] since your last visit? Include any pap smears or colon screening.  no

## 2021-06-04 NOTE — TELEPHONE ENCOUNTER
Patient also said he was told to stop taking his blood pressure before his surgery, he still is not taking it because he said he didn't know if he was able to or not. Phineas Ore he was never told wether to (or not to) start taking it after surgery.

## 2021-06-04 NOTE — TELEPHONE ENCOUNTER
Returned pts call using 2 pt identifiers. Pt is 8 days s/p Rt inguinal hernia repair with plug and patch. He reports that he is still having some swelling to the area and he feels a hard lump there. He wanted to make an appt to come in to have the area looked at. 2) He was never told when to restart his Bp medication after surgery. Can he restart this medication now? Informed will get this information to Dr. Rashaad Branham can get in contact with him once he responds. Pt voiced understandings.

## 2021-06-04 NOTE — TELEPHONE ENCOUNTER
Pt called saying his swelling has not gone down, but symptoms have not worsened. Wants to come in to the office asap to check on it. He spoke with [facility] yesterday.

## 2021-06-04 NOTE — LETTER
6/4/2021 Patient: Curt Rodrigues YOB: 1940 Date of Visit: 6/4/2021 Washington Cerda MD 
[address] [facility] [address] Via Fax: [phone] Dear Washington Cerda MD, Thank you for referring Mr. April Wilson to [facility] for evaluation. My notes for this consultation are attached. If you have questions, please do not hesitate to call me. I look forward to following your patient along with you. Sincerely, Jevon Abraham MD

## 2021-06-14 ENCOUNTER — OFFICE VISIT (OUTPATIENT)
Dept: SURGERY | Age: 81
End: 2021-06-14
Payer: MEDICARE

## 2021-06-14 VITALS
BODY MASS INDEX: 24.2 KG/M2 | SYSTOLIC BLOOD PRESSURE: 145 MMHG | WEIGHT: 163.4 LBS | OXYGEN SATURATION: 96 % | HEART RATE: 96 BPM | TEMPERATURE: 98.3 F | DIASTOLIC BLOOD PRESSURE: 82 MMHG | HEIGHT: 69 IN

## 2021-06-14 DIAGNOSIS — Z87.19 S/P HERNIA REPAIR: ICD-10-CM

## 2021-06-14 DIAGNOSIS — K40.90 NON-RECURRENT UNILATERAL INGUINAL HERNIA WITHOUT OBSTRUCTION OR GANGRENE: ICD-10-CM

## 2021-06-14 DIAGNOSIS — Z98.890 S/P HERNIA REPAIR: ICD-10-CM

## 2021-06-14 DIAGNOSIS — Z09 POSTOPERATIVE EXAMINATION: Primary | ICD-10-CM

## 2021-06-14 PROCEDURE — 99024 POSTOP FOLLOW-UP VISIT: CPT | Performed by: NURSE PRACTITIONER

## 2021-06-14 NOTE — LETTER
6/14/2021 Patient: Gallo Piper YOB: 1940 Date of Visit: 6/14/2021 Ivan Roberson MD 
[address] [facility] [address] Via Fax: [phone] Dear Ivan Roberson MD, Thank you for referring Mr. Mihai Taveras to [facility] for evaluation. My notes for this consultation are attached. If you have questions, please do not hesitate to call me. I look forward to following your patient along with you. Sincerely, Mehnaz Villagran NP

## 2021-06-14 NOTE — PROGRESS NOTES
Subjective:    Marcos Shipley is a 78 y.o. male presents for postop care 18 days following right inguinal hernia repair by Dr. Shiraz Seo. Appetite is good. Eating a regular diet  without difficulty. Bowel movements are  regular. The patient is not having any pain. . Denies fever, nausea, redness at incision site, vomiting and diarrhea    Objective:      Visit Vitals  BP (!) 145/82   Pulse 96   Temp 98.3 °F (36.8 °C)   Ht 5' 9\" (1.753 m)   Wt 163 lb 6.4 oz (74.1 kg)   SpO2 96%   BMI 24.13 kg/m²         General:  alert, no distress   Abdomen: soft   Incision:   healing well, no drainage, no erythema, no seroma, appropriate post op swelling, no dehiscence, incisions well approximated   Heart: regular rate and rhythm, S1, S2 normal, no murmur, click, rub or gallop   Lungs: clear to auscultation bilaterally       Assessment:     Right inguinal hernia status post repair. Doing well postoperatively. Plan:     1. Pt is to increase activities as tolerated. May lift 15 lbs x 2 weeks, then 30 lbs x 2 weeks and increase as tolerated there after. No biking until 6 weeks post op. 2. Follow-up prn.  3. Wound care discussed    Mr. [address] Franklin has a reminder for a \"due or due soon\" health maintenance. I have asked that he contact his primary care provider for follow-up on this health maintenance. Patient verbalized understanding and agreement.

## 2023-05-11 RX ORDER — ASPIRIN 81 MG/1
TABLET, CHEWABLE ORAL
COMMUNITY

## 2023-05-11 RX ORDER — UREA 10 %
1 LOTION (ML) TOPICAL PRN
COMMUNITY

## 2023-05-11 RX ORDER — LISINOPRIL 40 MG/1
40 TABLET ORAL EVERY MORNING
COMMUNITY

## 2023-05-11 RX ORDER — OMEPRAZOLE 10 MG/1
CAPSULE, DELAYED RELEASE ORAL
COMMUNITY

## 2024-07-29 ENCOUNTER — HOSPITAL ENCOUNTER (OUTPATIENT)
Facility: HOSPITAL | Age: 84
Discharge: HOME | End: 2024-08-01
Payer: MEDICARE

## 2024-07-29 DIAGNOSIS — R53.82 CHRONIC FATIGUE: ICD-10-CM

## 2024-07-29 PROCEDURE — 71046 X-RAY EXAM CHEST 2 VIEWS: CPT

## 2024-09-04 ENCOUNTER — HOSPITAL ENCOUNTER (OUTPATIENT)
Facility: HOSPITAL | Age: 84
Discharge: HOME | End: 2024-09-07
Payer: MEDICARE

## 2024-09-04 DIAGNOSIS — R41.89 MODERATE COGNITIVE IMPAIRMENT: ICD-10-CM

## 2024-09-04 PROCEDURE — 70551 MRI BRAIN STEM W/O DYE: CPT

## 2025-02-15 ENCOUNTER — APPOINTMENT (OUTPATIENT)
Facility: HOSPITAL | Age: 85
DRG: 871 | End: 2025-02-15
Attending: STUDENT IN AN ORGANIZED HEALTH CARE EDUCATION/TRAINING PROGRAM
Payer: MEDICARE

## 2025-02-15 ENCOUNTER — APPOINTMENT (OUTPATIENT)
Facility: HOSPITAL | Age: 85
DRG: 871 | End: 2025-02-15
Payer: MEDICARE

## 2025-02-15 ENCOUNTER — HOSPITAL ENCOUNTER (INPATIENT)
Facility: HOSPITAL | Age: 85
LOS: 4 days | Discharge: HOME | DRG: 871 | End: 2025-02-19
Attending: EMERGENCY MEDICINE | Admitting: STUDENT IN AN ORGANIZED HEALTH CARE EDUCATION/TRAINING PROGRAM
Payer: MEDICARE

## 2025-02-15 DIAGNOSIS — N17.9 AKI (ACUTE KIDNEY INJURY): ICD-10-CM

## 2025-02-15 DIAGNOSIS — R40.4 UNRESPONSIVE EPISODE: ICD-10-CM

## 2025-02-15 DIAGNOSIS — I95.9 HYPOTENSION, UNSPECIFIED HYPOTENSION TYPE: ICD-10-CM

## 2025-02-15 DIAGNOSIS — E87.20 LACTIC ACIDOSIS: Primary | ICD-10-CM

## 2025-02-15 DIAGNOSIS — R11.10 ABDOMINAL PAIN WITH VOMITING: ICD-10-CM

## 2025-02-15 DIAGNOSIS — R10.9 ABDOMINAL PAIN WITH VOMITING: ICD-10-CM

## 2025-02-15 PROBLEM — R65.20 SEVERE SEPSIS (HCC): Status: ACTIVE | Noted: 2025-02-15

## 2025-02-15 PROBLEM — A41.9 SEVERE SEPSIS (HCC): Status: ACTIVE | Noted: 2025-02-15

## 2025-02-15 LAB
ABO + RH BLD: NORMAL
ALBUMIN SERPL-MCNC: 2.8 G/DL (ref 3.5–5)
ALBUMIN SERPL-MCNC: 3.4 G/DL (ref 3.5–5)
ALBUMIN/GLOB SERPL: 1 (ref 1.1–2.2)
ALBUMIN/GLOB SERPL: 1.1 (ref 1.1–2.2)
ALP SERPL-CCNC: 42 U/L (ref 45–117)
ALP SERPL-CCNC: 56 U/L (ref 45–117)
ALT SERPL-CCNC: 14 U/L (ref 12–78)
ALT SERPL-CCNC: 14 U/L (ref 12–78)
AMPHET UR QL SCN: NEGATIVE
ANION GAP BLD CALC-SCNC: 11 (ref 10–20)
ANION GAP SERPL CALC-SCNC: 7 MMOL/L (ref 2–12)
ANION GAP SERPL CALC-SCNC: 7 MMOL/L (ref 2–12)
APPEARANCE UR: CLEAR
AST SERPL-CCNC: 13 U/L (ref 15–37)
AST SERPL-CCNC: 17 U/L (ref 15–37)
B PERT DNA SPEC QL NAA+PROBE: NOT DETECTED
BACTERIA URNS QL MICRO: NEGATIVE /HPF
BARBITURATES UR QL SCN: NEGATIVE
BASE DEFICIT BLD-SCNC: 1.3 MMOL/L
BASE DEFICIT BLDV-SCNC: 2.9 MMOL/L
BASOPHILS # BLD: 0.06 K/UL (ref 0–0.1)
BASOPHILS NFR BLD: 0.3 % (ref 0–1)
BENZODIAZ UR QL: NEGATIVE
BILIRUB DIRECT SERPL-MCNC: <0.1 MG/DL (ref 0–0.2)
BILIRUB SERPL-MCNC: 0.4 MG/DL (ref 0.2–1)
BILIRUB SERPL-MCNC: 0.4 MG/DL (ref 0.2–1)
BILIRUB UR QL: NEGATIVE
BLOOD GROUP ANTIBODIES SERPL: NORMAL
BORDETELLA PARAPERTUSSIS BY PCR: NOT DETECTED
BUN SERPL-MCNC: 21 MG/DL (ref 6–20)
BUN SERPL-MCNC: 24 MG/DL (ref 6–20)
BUN/CREAT SERPL: 16 (ref 12–20)
BUN/CREAT SERPL: 17 (ref 12–20)
C PNEUM DNA SPEC QL NAA+PROBE: NOT DETECTED
CA-I BLD-MCNC: 1.18 MMOL/L (ref 1.15–1.33)
CALCIUM SERPL-MCNC: 7.8 MG/DL (ref 8.5–10.1)
CALCIUM SERPL-MCNC: 8.5 MG/DL (ref 8.5–10.1)
CANNABINOIDS UR QL SCN: NEGATIVE
CHLORIDE BLD-SCNC: 104 MMOL/L (ref 100–111)
CHLORIDE SERPL-SCNC: 107 MMOL/L (ref 97–108)
CHLORIDE SERPL-SCNC: 110 MMOL/L (ref 97–108)
CO2 BLD-SCNC: 26 MMOL/L (ref 22–29)
CO2 SERPL-SCNC: 23 MMOL/L (ref 21–32)
CO2 SERPL-SCNC: 26 MMOL/L (ref 21–32)
COCAINE UR QL SCN: NEGATIVE
COLOR UR: ABNORMAL
COMMENT:: NORMAL
CREAT SERPL-MCNC: 1.24 MG/DL (ref 0.7–1.3)
CREAT SERPL-MCNC: 1.51 MG/DL (ref 0.7–1.3)
CREAT UR-MCNC: 1.2 MG/DL (ref 0.6–1.3)
DIFFERENTIAL METHOD BLD: ABNORMAL
ECHO AO ASC DIAM: 3 CM
ECHO AO ASCENDING AORTA INDEX: 1.57 CM/M2
ECHO AO ROOT DIAM: 3.3 CM
ECHO AO ROOT INDEX: 1.73 CM/M2
ECHO AV AREA PEAK VELOCITY: 1.8 CM2
ECHO AV AREA VTI: 1.9 CM2
ECHO AV AREA/BSA PEAK VELOCITY: 0.9 CM2/M2
ECHO AV AREA/BSA VTI: 1 CM2/M2
ECHO AV MEAN GRADIENT: 6 MMHG
ECHO AV MEAN VELOCITY: 1.2 M/S
ECHO AV PEAK GRADIENT: 13 MMHG
ECHO AV PEAK VELOCITY: 1.8 M/S
ECHO AV VELOCITY RATIO: 0.56
ECHO AV VTI: 34.2 CM
ECHO EST RA PRESSURE: 3 MMHG
ECHO LA DIAMETER INDEX: 1.68 CM/M2
ECHO LA DIAMETER: 3.2 CM
ECHO LA TO AORTIC ROOT RATIO: 0.97
ECHO LV E' LATERAL VELOCITY: 10.7 CM/S
ECHO LV E' SEPTAL VELOCITY: 8.99 CM/S
ECHO LV EF PHYSICIAN: 60 %
ECHO LV FRACTIONAL SHORTENING: 31 % (ref 28–44)
ECHO LV INTERNAL DIMENSION DIASTOLE INDEX: 2.36 CM/M2
ECHO LV INTERNAL DIMENSION DIASTOLIC: 4.5 CM (ref 4.2–5.9)
ECHO LV INTERNAL DIMENSION SYSTOLIC INDEX: 1.62 CM/M2
ECHO LV INTERNAL DIMENSION SYSTOLIC: 3.1 CM
ECHO LV IVSD: 1 CM (ref 0.6–1)
ECHO LV MASS 2D: 164 G (ref 88–224)
ECHO LV MASS INDEX 2D: 85.9 G/M2 (ref 49–115)
ECHO LV POSTERIOR WALL DIASTOLIC: 1.1 CM (ref 0.6–1)
ECHO LV RELATIVE WALL THICKNESS RATIO: 0.49
ECHO LVOT AREA: 3.1 CM2
ECHO LVOT AV VTI INDEX: 0.64
ECHO LVOT DIAM: 2 CM
ECHO LVOT MEAN GRADIENT: 2 MMHG
ECHO LVOT PEAK GRADIENT: 4 MMHG
ECHO LVOT PEAK VELOCITY: 1 M/S
ECHO LVOT STROKE VOLUME INDEX: 35.8 ML/M2
ECHO LVOT SV: 68.5 ML
ECHO LVOT VTI: 21.8 CM
ECHO MV A VELOCITY: 0.99 M/S
ECHO MV AREA PHT: 4.1 CM2
ECHO MV AREA VTI: 1.8 CM2
ECHO MV E DECELERATION TIME (DT): 185.9 MS
ECHO MV E VELOCITY: 0.84 M/S
ECHO MV E/A RATIO: 0.85
ECHO MV E/E' LATERAL: 7.85
ECHO MV E/E' RATIO (AVERAGED): 8.6
ECHO MV E/E' SEPTAL: 9.34
ECHO MV LVOT VTI INDEX: 1.72
ECHO MV MAX VELOCITY: 1.1 M/S
ECHO MV MEAN GRADIENT: 3 MMHG
ECHO MV MEAN VELOCITY: 0.8 M/S
ECHO MV PEAK GRADIENT: 5 MMHG
ECHO MV PRESSURE HALF TIME (PHT): 53.9 MS
ECHO MV VTI: 37.5 CM
ECHO RIGHT VENTRICULAR SYSTOLIC PRESSURE (RVSP): 22 MMHG
ECHO RV TAPSE: 2.8 CM (ref 1.7–?)
ECHO TV REGURGITANT MAX VELOCITY: 2.18 M/S
ECHO TV REGURGITANT PEAK GRADIENT: 19 MMHG
EKG ATRIAL RATE: 88 BPM
EKG DIAGNOSIS: NORMAL
EKG P AXIS: 48 DEGREES
EKG P-R INTERVAL: 198 MS
EKG Q-T INTERVAL: 382 MS
EKG QRS DURATION: 94 MS
EKG QTC CALCULATION (BAZETT): 462 MS
EKG R AXIS: -36 DEGREES
EKG T AXIS: 46 DEGREES
EKG VENTRICULAR RATE: 88 BPM
EOSINOPHIL # BLD: 0.11 K/UL (ref 0–0.4)
EOSINOPHIL NFR BLD: 0.6 % (ref 0–7)
EPITH CASTS URNS QL MICRO: ABNORMAL /LPF
ERYTHROCYTE [DISTWIDTH] IN BLOOD BY AUTOMATED COUNT: 12.9 % (ref 11.5–14.5)
ETHANOL SERPL-MCNC: <10 MG/DL (ref 0–0.08)
FLUAV RNA SPEC QL NAA+PROBE: NOT DETECTED
FLUAV SUBTYP SPEC NAA+PROBE: NOT DETECTED
FLUBV RNA SPEC QL NAA+PROBE: NOT DETECTED
FLUBV RNA SPEC QL NAA+PROBE: NOT DETECTED
GLOBULIN SER CALC-MCNC: 2.7 G/DL (ref 2–4)
GLOBULIN SER CALC-MCNC: 3.1 G/DL (ref 2–4)
GLUCOSE BLD STRIP.AUTO-MCNC: 115 MG/DL (ref 74–99)
GLUCOSE SERPL-MCNC: 158 MG/DL (ref 65–100)
GLUCOSE SERPL-MCNC: 96 MG/DL (ref 65–100)
GLUCOSE UR STRIP.AUTO-MCNC: NEGATIVE MG/DL
HADV DNA SPEC QL NAA+PROBE: NOT DETECTED
HCO3 BLDA-SCNC: 26 MMOL/L
HCO3 BLDV-SCNC: 23.5 MMOL/L (ref 23–28)
HCOV 229E RNA SPEC QL NAA+PROBE: NOT DETECTED
HCOV HKU1 RNA SPEC QL NAA+PROBE: NOT DETECTED
HCOV NL63 RNA SPEC QL NAA+PROBE: NOT DETECTED
HCOV OC43 RNA SPEC QL NAA+PROBE: NOT DETECTED
HCT VFR BLD AUTO: 39 % (ref 36.6–50.3)
HGB BLD-MCNC: 13.3 G/DL (ref 12.1–17)
HGB UR QL STRIP: ABNORMAL
HMPV RNA SPEC QL NAA+PROBE: NOT DETECTED
HPIV1 RNA SPEC QL NAA+PROBE: NOT DETECTED
HPIV2 RNA SPEC QL NAA+PROBE: NOT DETECTED
HPIV3 RNA SPEC QL NAA+PROBE: NOT DETECTED
HPIV4 RNA SPEC QL NAA+PROBE: NOT DETECTED
HYALINE CASTS URNS QL MICRO: ABNORMAL /LPF (ref 0–5)
IMM GRANULOCYTES # BLD AUTO: 0.09 K/UL (ref 0–0.04)
IMM GRANULOCYTES NFR BLD AUTO: 0.5 % (ref 0–0.5)
KETONES UR QL STRIP.AUTO: NEGATIVE MG/DL
LACTATE BLD-SCNC: 2.54 MMOL/L (ref 0.4–2)
LACTATE BLD-SCNC: 2.74 MMOL/L (ref 0.4–2)
LACTATE SERPL-SCNC: 1.1 MMOL/L (ref 0.4–2)
LACTATE SERPL-SCNC: 3.6 MMOL/L (ref 0.4–2)
LEUKOCYTE ESTERASE UR QL STRIP.AUTO: NEGATIVE
LIPASE SERPL-CCNC: 32 U/L (ref 13–75)
LYMPHOCYTES # BLD: 2.72 K/UL (ref 0.8–3.5)
LYMPHOCYTES NFR BLD: 14.7 % (ref 12–49)
Lab: NORMAL
M PNEUMO DNA SPEC QL NAA+PROBE: NOT DETECTED
MAGNESIUM SERPL-MCNC: 1.7 MG/DL (ref 1.6–2.4)
MCH RBC QN AUTO: 30.4 PG (ref 26–34)
MCHC RBC AUTO-ENTMCNC: 34.1 G/DL (ref 30–36.5)
MCV RBC AUTO: 89.2 FL (ref 80–99)
METHADONE UR QL: NEGATIVE
MONOCYTES # BLD: 1.27 K/UL (ref 0–1)
MONOCYTES NFR BLD: 6.9 % (ref 5–13)
NEUTS SEG # BLD: 14.29 K/UL (ref 1.8–8)
NEUTS SEG NFR BLD: 77 % (ref 32–75)
NITRITE UR QL STRIP.AUTO: NEGATIVE
NRBC # BLD: 0 K/UL (ref 0–0.01)
NRBC BLD-RTO: 0 PER 100 WBC
NT PRO BNP: 74 PG/ML
OPIATES UR QL: NEGATIVE
PCO2 BLDV: 46.8 MMHG (ref 41–51)
PCO2 BLDV: 56 MMHG (ref 41–51)
PCP UR QL: NEGATIVE
PH BLDV: 7.28 (ref 7.32–7.42)
PH BLDV: 7.31 (ref 7.32–7.42)
PH UR STRIP: 5.5 (ref 5–8)
PLATELET # BLD AUTO: 283 K/UL (ref 150–400)
PMV BLD AUTO: 9.8 FL (ref 8.9–12.9)
PO2 BLDV: <27 MMHG (ref 25–40)
PO2 BLDV: <27 MMHG (ref 25–40)
POTASSIUM BLD-SCNC: 4.8 MMOL/L (ref 3.5–5.5)
POTASSIUM SERPL-SCNC: 4.3 MMOL/L (ref 3.5–5.1)
POTASSIUM SERPL-SCNC: 4.4 MMOL/L (ref 3.5–5.1)
PROT SERPL-MCNC: 5.5 G/DL (ref 6.4–8.2)
PROT SERPL-MCNC: 6.5 G/DL (ref 6.4–8.2)
PROT UR STRIP-MCNC: NEGATIVE MG/DL
RBC # BLD AUTO: 4.37 M/UL (ref 4.1–5.7)
RBC #/AREA URNS HPF: ABNORMAL /HPF (ref 0–5)
RSV RNA SPEC QL NAA+PROBE: NOT DETECTED
RV+EV RNA SPEC QL NAA+PROBE: NOT DETECTED
SARS-COV-2 RNA RESP QL NAA+PROBE: NOT DETECTED
SARS-COV-2 RNA RESP QL NAA+PROBE: NOT DETECTED
SERVICE CMNT-IMP: 123
SERVICE CMNT-IMP: ABNORMAL
SODIUM BLD-SCNC: 141 MMOL/L (ref 136–145)
SODIUM SERPL-SCNC: 140 MMOL/L (ref 136–145)
SODIUM SERPL-SCNC: 140 MMOL/L (ref 136–145)
SOURCE: NORMAL
SP GR UR REFRACTOMETRY: 1.02 (ref 1–1.03)
SPECIMEN EXP DATE BLD: NORMAL
SPECIMEN HOLD: NORMAL
SPECIMEN SITE: ABNORMAL
SPECIMEN TYPE: ABNORMAL
TROPONIN I SERPL HS-MCNC: 71 NG/L (ref 0–76)
TROPONIN I SERPL HS-MCNC: 8 NG/L (ref 0–76)
TSH SERPL DL<=0.05 MIU/L-ACNC: 1.38 UIU/ML (ref 0.36–3.74)
URINE CULTURE IF INDICATED: ABNORMAL
UROBILINOGEN UR QL STRIP.AUTO: 0.2 EU/DL (ref 0.2–1)
WBC # BLD AUTO: 18.5 K/UL (ref 4.1–11.1)
WBC URNS QL MICRO: ABNORMAL /HPF (ref 0–4)

## 2025-02-15 PROCEDURE — 82077 ASSAY SPEC XCP UR&BREATH IA: CPT

## 2025-02-15 PROCEDURE — 71275 CT ANGIOGRAPHY CHEST: CPT

## 2025-02-15 PROCEDURE — 96361 HYDRATE IV INFUSION ADD-ON: CPT

## 2025-02-15 PROCEDURE — 87040 BLOOD CULTURE FOR BACTERIA: CPT

## 2025-02-15 PROCEDURE — 86850 RBC ANTIBODY SCREEN: CPT

## 2025-02-15 PROCEDURE — 2580000003 HC RX 258: Performed by: EMERGENCY MEDICINE

## 2025-02-15 PROCEDURE — 93880 EXTRACRANIAL BILAT STUDY: CPT

## 2025-02-15 PROCEDURE — 81001 URINALYSIS AUTO W/SCOPE: CPT

## 2025-02-15 PROCEDURE — 6370000000 HC RX 637 (ALT 250 FOR IP): Performed by: STUDENT IN AN ORGANIZED HEALTH CARE EDUCATION/TRAINING PROGRAM

## 2025-02-15 PROCEDURE — 97161 PT EVAL LOW COMPLEX 20 MIN: CPT

## 2025-02-15 PROCEDURE — 93005 ELECTROCARDIOGRAM TRACING: CPT | Performed by: EMERGENCY MEDICINE

## 2025-02-15 PROCEDURE — 2700000000 HC OXYGEN THERAPY PER DAY

## 2025-02-15 PROCEDURE — 97165 OT EVAL LOW COMPLEX 30 MIN: CPT

## 2025-02-15 PROCEDURE — 6360000002 HC RX W HCPCS: Performed by: STUDENT IN AN ORGANIZED HEALTH CARE EDUCATION/TRAINING PROGRAM

## 2025-02-15 PROCEDURE — 82947 ASSAY GLUCOSE BLOOD QUANT: CPT

## 2025-02-15 PROCEDURE — 84484 ASSAY OF TROPONIN QUANT: CPT

## 2025-02-15 PROCEDURE — 2580000003 HC RX 258: Performed by: STUDENT IN AN ORGANIZED HEALTH CARE EDUCATION/TRAINING PROGRAM

## 2025-02-15 PROCEDURE — 87636 SARSCOV2 & INF A&B AMP PRB: CPT

## 2025-02-15 PROCEDURE — 83605 ASSAY OF LACTIC ACID: CPT

## 2025-02-15 PROCEDURE — 83880 ASSAY OF NATRIURETIC PEPTIDE: CPT

## 2025-02-15 PROCEDURE — 80053 COMPREHEN METABOLIC PANEL: CPT

## 2025-02-15 PROCEDURE — 86901 BLOOD TYPING SEROLOGIC RH(D): CPT

## 2025-02-15 PROCEDURE — 6360000002 HC RX W HCPCS: Performed by: EMERGENCY MEDICINE

## 2025-02-15 PROCEDURE — 80076 HEPATIC FUNCTION PANEL: CPT

## 2025-02-15 PROCEDURE — 2060000000 HC ICU INTERMEDIATE R&B

## 2025-02-15 PROCEDURE — 96375 TX/PRO/DX INJ NEW DRUG ADDON: CPT

## 2025-02-15 PROCEDURE — 82330 ASSAY OF CALCIUM: CPT

## 2025-02-15 PROCEDURE — 84295 ASSAY OF SERUM SODIUM: CPT

## 2025-02-15 PROCEDURE — 83690 ASSAY OF LIPASE: CPT

## 2025-02-15 PROCEDURE — 97530 THERAPEUTIC ACTIVITIES: CPT

## 2025-02-15 PROCEDURE — 2500000003 HC RX 250 WO HCPCS: Performed by: STUDENT IN AN ORGANIZED HEALTH CARE EDUCATION/TRAINING PROGRAM

## 2025-02-15 PROCEDURE — 96374 THER/PROPH/DIAG INJ IV PUSH: CPT

## 2025-02-15 PROCEDURE — 93306 TTE W/DOPPLER COMPLETE: CPT

## 2025-02-15 PROCEDURE — 84132 ASSAY OF SERUM POTASSIUM: CPT

## 2025-02-15 PROCEDURE — 94760 N-INVAS EAR/PLS OXIMETRY 1: CPT

## 2025-02-15 PROCEDURE — 83735 ASSAY OF MAGNESIUM: CPT

## 2025-02-15 PROCEDURE — 80307 DRUG TEST PRSMV CHEM ANLYZR: CPT

## 2025-02-15 PROCEDURE — 99285 EMERGENCY DEPT VISIT HI MDM: CPT

## 2025-02-15 PROCEDURE — 36415 COLL VENOUS BLD VENIPUNCTURE: CPT

## 2025-02-15 PROCEDURE — 82803 BLOOD GASES ANY COMBINATION: CPT

## 2025-02-15 PROCEDURE — 70450 CT HEAD/BRAIN W/O DYE: CPT

## 2025-02-15 PROCEDURE — 6360000004 HC RX CONTRAST MEDICATION: Performed by: RADIOLOGY

## 2025-02-15 PROCEDURE — 97535 SELF CARE MNGMENT TRAINING: CPT

## 2025-02-15 PROCEDURE — 86900 BLOOD TYPING SEROLOGIC ABO: CPT

## 2025-02-15 PROCEDURE — 71045 X-RAY EXAM CHEST 1 VIEW: CPT

## 2025-02-15 PROCEDURE — 84443 ASSAY THYROID STIM HORMONE: CPT

## 2025-02-15 PROCEDURE — 0202U NFCT DS 22 TRGT SARS-COV-2: CPT

## 2025-02-15 PROCEDURE — 74177 CT ABD & PELVIS W/CONTRAST: CPT

## 2025-02-15 PROCEDURE — 85025 COMPLETE CBC W/AUTO DIFF WBC: CPT

## 2025-02-15 RX ORDER — POTASSIUM CHLORIDE 750 MG/1
40 TABLET, EXTENDED RELEASE ORAL PRN
Status: DISCONTINUED | OUTPATIENT
Start: 2025-02-15 | End: 2025-02-19 | Stop reason: HOSPADM

## 2025-02-15 RX ORDER — 0.9 % SODIUM CHLORIDE 0.9 %
30 INTRAVENOUS SOLUTION INTRAVENOUS ONCE
Status: COMPLETED | OUTPATIENT
Start: 2025-02-15 | End: 2025-02-15

## 2025-02-15 RX ORDER — ONDANSETRON 2 MG/ML
4 INJECTION INTRAMUSCULAR; INTRAVENOUS ONCE
Status: COMPLETED | OUTPATIENT
Start: 2025-02-15 | End: 2025-02-15

## 2025-02-15 RX ORDER — SODIUM CHLORIDE 0.9 % (FLUSH) 0.9 %
5-40 SYRINGE (ML) INJECTION EVERY 12 HOURS SCHEDULED
Status: DISCONTINUED | OUTPATIENT
Start: 2025-02-15 | End: 2025-02-19 | Stop reason: HOSPADM

## 2025-02-15 RX ORDER — ONDANSETRON 2 MG/ML
4 INJECTION INTRAMUSCULAR; INTRAVENOUS EVERY 6 HOURS PRN
Status: DISCONTINUED | OUTPATIENT
Start: 2025-02-15 | End: 2025-02-19 | Stop reason: HOSPADM

## 2025-02-15 RX ORDER — MAGNESIUM SULFATE IN WATER 40 MG/ML
2000 INJECTION, SOLUTION INTRAVENOUS PRN
Status: DISCONTINUED | OUTPATIENT
Start: 2025-02-15 | End: 2025-02-19 | Stop reason: HOSPADM

## 2025-02-15 RX ORDER — ASPIRIN 81 MG/1
81 TABLET, CHEWABLE ORAL DAILY
Status: DISCONTINUED | OUTPATIENT
Start: 2025-02-15 | End: 2025-02-19 | Stop reason: HOSPADM

## 2025-02-15 RX ORDER — PANTOPRAZOLE SODIUM 20 MG/1
20 TABLET, DELAYED RELEASE ORAL
Status: DISCONTINUED | OUTPATIENT
Start: 2025-02-15 | End: 2025-02-19 | Stop reason: HOSPADM

## 2025-02-15 RX ORDER — SODIUM CHLORIDE 0.9 % (FLUSH) 0.9 %
5-40 SYRINGE (ML) INJECTION PRN
Status: DISCONTINUED | OUTPATIENT
Start: 2025-02-15 | End: 2025-02-19 | Stop reason: HOSPADM

## 2025-02-15 RX ORDER — SODIUM CHLORIDE 9 MG/ML
INJECTION, SOLUTION INTRAVENOUS PRN
Status: DISCONTINUED | OUTPATIENT
Start: 2025-02-15 | End: 2025-02-19 | Stop reason: HOSPADM

## 2025-02-15 RX ORDER — IOPAMIDOL 755 MG/ML
100 INJECTION, SOLUTION INTRAVASCULAR
Status: COMPLETED | OUTPATIENT
Start: 2025-02-15 | End: 2025-02-15

## 2025-02-15 RX ORDER — ACETAMINOPHEN 650 MG/1
650 SUPPOSITORY RECTAL EVERY 6 HOURS PRN
Status: DISCONTINUED | OUTPATIENT
Start: 2025-02-15 | End: 2025-02-19 | Stop reason: HOSPADM

## 2025-02-15 RX ORDER — ACETAMINOPHEN 325 MG/1
650 TABLET ORAL EVERY 6 HOURS PRN
Status: DISCONTINUED | OUTPATIENT
Start: 2025-02-15 | End: 2025-02-19 | Stop reason: HOSPADM

## 2025-02-15 RX ORDER — POLYETHYLENE GLYCOL 3350 17 G/17G
17 POWDER, FOR SOLUTION ORAL DAILY PRN
Status: DISCONTINUED | OUTPATIENT
Start: 2025-02-15 | End: 2025-02-19 | Stop reason: HOSPADM

## 2025-02-15 RX ORDER — ONDANSETRON 4 MG/1
4 TABLET, ORALLY DISINTEGRATING ORAL EVERY 8 HOURS PRN
Status: DISCONTINUED | OUTPATIENT
Start: 2025-02-15 | End: 2025-02-19 | Stop reason: HOSPADM

## 2025-02-15 RX ORDER — POTASSIUM CHLORIDE 7.45 MG/ML
10 INJECTION INTRAVENOUS PRN
Status: DISCONTINUED | OUTPATIENT
Start: 2025-02-15 | End: 2025-02-19 | Stop reason: HOSPADM

## 2025-02-15 RX ORDER — 0.9 % SODIUM CHLORIDE 0.9 %
1000 INTRAVENOUS SOLUTION INTRAVENOUS ONCE
Status: COMPLETED | OUTPATIENT
Start: 2025-02-15 | End: 2025-02-15

## 2025-02-15 RX ORDER — SODIUM CHLORIDE 9 MG/ML
INJECTION, SOLUTION INTRAVENOUS CONTINUOUS
Status: DISPENSED | OUTPATIENT
Start: 2025-02-15 | End: 2025-02-16

## 2025-02-15 RX ORDER — HEPARIN SODIUM 5000 [USP'U]/ML
5000 INJECTION, SOLUTION INTRAVENOUS; SUBCUTANEOUS EVERY 8 HOURS
Status: DISCONTINUED | OUTPATIENT
Start: 2025-02-15 | End: 2025-02-19 | Stop reason: HOSPADM

## 2025-02-15 RX ADMIN — SODIUM CHLORIDE: 9 INJECTION, SOLUTION INTRAVENOUS at 16:17

## 2025-02-15 RX ADMIN — HEPARIN SODIUM 5000 UNITS: 5000 INJECTION INTRAVENOUS; SUBCUTANEOUS at 07:32

## 2025-02-15 RX ADMIN — PIPERACILLIN AND TAZOBACTAM 3375 MG: 3; .375 INJECTION, POWDER, LYOPHILIZED, FOR SOLUTION INTRAVENOUS at 09:23

## 2025-02-15 RX ADMIN — ASPIRIN 81 MG CHEWABLE TABLET 81 MG: 81 TABLET CHEWABLE at 09:21

## 2025-02-15 RX ADMIN — PIPERACILLIN AND TAZOBACTAM 4500 MG: 4; .5 INJECTION, POWDER, LYOPHILIZED, FOR SOLUTION INTRAVENOUS at 04:00

## 2025-02-15 RX ADMIN — SODIUM CHLORIDE, PRESERVATIVE FREE 10 ML: 5 INJECTION INTRAVENOUS at 09:24

## 2025-02-15 RX ADMIN — SODIUM CHLORIDE 1000 ML: 9 INJECTION, SOLUTION INTRAVENOUS at 06:39

## 2025-02-15 RX ADMIN — SODIUM CHLORIDE, PRESERVATIVE FREE 10 ML: 5 INJECTION INTRAVENOUS at 20:44

## 2025-02-15 RX ADMIN — SODIUM CHLORIDE: 9 INJECTION, SOLUTION INTRAVENOUS at 07:32

## 2025-02-15 RX ADMIN — PANTOPRAZOLE SODIUM 20 MG: 20 TABLET, DELAYED RELEASE ORAL at 17:31

## 2025-02-15 RX ADMIN — IOPAMIDOL 100 ML: 755 INJECTION, SOLUTION INTRAVENOUS at 04:19

## 2025-02-15 RX ADMIN — SODIUM CHLORIDE 2220 ML: 9 INJECTION, SOLUTION INTRAVENOUS at 03:48

## 2025-02-15 RX ADMIN — HEPARIN SODIUM 5000 UNITS: 5000 INJECTION INTRAVENOUS; SUBCUTANEOUS at 22:34

## 2025-02-15 RX ADMIN — PIPERACILLIN AND TAZOBACTAM 3375 MG: 3; .375 INJECTION, POWDER, LYOPHILIZED, FOR SOLUTION INTRAVENOUS at 17:35

## 2025-02-15 RX ADMIN — ONDANSETRON 4 MG: 2 INJECTION INTRAMUSCULAR; INTRAVENOUS at 05:34

## 2025-02-15 RX ADMIN — PANTOPRAZOLE SODIUM 20 MG: 20 TABLET, DELAYED RELEASE ORAL at 07:34

## 2025-02-15 ASSESSMENT — PAIN SCALES - GENERAL
PAINLEVEL_OUTOF10: 0

## 2025-02-15 ASSESSMENT — PAIN - FUNCTIONAL ASSESSMENT: PAIN_FUNCTIONAL_ASSESSMENT: NONE - DENIES PAIN

## 2025-02-15 NOTE — ED NOTES
Pt has had two diarrhea bowel movements, green in color; pt still denies any abdominal pain. New brief and bed pads have been placed.    Provider requested to place pt on 5L NC to see how he does; pt is currently on 5L NC and O2 sats are 100%

## 2025-02-15 NOTE — ED PROVIDER NOTES
[facility] EMERGENCY DEPARTMENT  EMERGENCY DEPARTMENT ENCOUNTER      Pt Name: Benson Paez  MRN: [id number]  Birthdate 1940  Date of evaluation: 2/15/2025  Provider: Oscar Yusuf MD    CHIEF COMPLAINT       Chief Complaint   Patient presents with    Shortness of Breath    Hypotension    Altered Mental Status     Pt is coming from home via EMS; pt was in the bathroom c/o abd pain, n/v and was less responsive per his friend since 0100; O2 sats were in the 70s-80s on RA and BP 75/51 on scene; pt arrived on NR @10 L         HISTORY OF PRESENT ILLNESS   (Location/Symptom, Timing/Onset, Context/Setting, Quality, Duration, Modifying Factors, Severity)  Note limiting factors.   84M w/ hx HTN and dementia p/w 1d AMS and abd pain. EMS called to home for AMS and found him unconscious and lying next to the toilet w/ emesis. Was initially hypoxic and hypotensive. Pt continued to have multiple episodes of emesis en route. He is a very limited historian. His baseline is unknown.    Friend says that they were running errands yesterday and that he was acting normally. Last night told friend that was c/o abd pain and had N/V.            Review of External Medical Records:     Nursing Notes were reviewed.    REVIEW OF SYSTEMS    (2-9 systems for level 4, 10 or more for level 5)     Review of Systems   Unable to perform ROS: Acuity of condition       Except as noted above the remainder of the review of systems was reviewed and negative.       PAST MEDICAL HISTORY     Past Medical History:   Diagnosis Date    GERD (gastroesophageal reflux disease)     Hypertension     Right inguinal hernia 05/27/2021         SURGICAL HISTORY       Past Surgical History:   Procedure Laterality Date    CATARACT REMOVAL Bilateral     CHOLECYSTECTOMY      COLONOSCOPY  5/2013    with polyps    COLONOSCOPY Left 11/20/2017    COLONOSCOPY performed by Shane Diamond MD at [facility] ENDOSCOPY         CURRENT MEDICATIONS       Previous Medications     wheezing, rhonchi or rales.   Abdominal:      General: Abdomen is flat. There is no distension.      Palpations: Abdomen is soft.      Tenderness: There is no abdominal tenderness. There is no guarding or rebound.   Musculoskeletal:         General: No swelling or deformity.      Right lower leg: No edema.      Left lower leg: No edema.   Skin:     General: Skin is warm.      Capillary Refill: Capillary refill takes less than 2 seconds.      Coloration: Skin is not jaundiced or pale.   Neurological:      General: No focal deficit present.      Mental Status: He is alert.      Cranial Nerves: No cranial nerve deficit.      Sensory: No sensory deficit.      Motor: No weakness.         DIAGNOSTIC RESULTS     EKG: All EKG's are interpreted by the Emergency Department Physician who either signs or Co-signs this chart in the absence of a cardiologist.    EKG Interpretation  SR, narrow QRS, nl intervals, no LAUREN/STD/TWI    RADIOLOGY:   Interpretation per the Radiologist below, if available at the time of this note:    CT HEAD WO CONTRAST   Final Result      No acute intracranial abnormality.         Electronically signed by Ranjeet Dominguez      CTA CHEST W WO CONTRAST   Final Result   1. No acute pulmonary artery embolism or other acute abnormality in the chest.      2. No acute abnormality in the abdomen or pelvis. Infrarenal aortic aneurysm   measuring 3.2 cm.         Electronically signed by Ranjeet Dominguez      CT ABDOMEN PELVIS W IV CONTRAST Additional Contrast? None   Final Result   1. No acute pulmonary artery embolism or other acute abnormality in the chest.      2. No acute abnormality in the abdomen or pelvis. Infrarenal aortic aneurysm   measuring 3.2 cm.         Electronically signed by Ranjeet Dominguez      XR CHEST PORTABLE   Final Result      No acute process.         Electronically signed by Ranjeet Dominguez      Vascular duplex carotid bilateral    (Results Pending)        LABS:  Admission on 02/15/2025

## 2025-02-15 NOTE — PLAN OF CARE
Problem: Occupational Therapy - Adult  Goal: By Discharge: Performs self-care activities at highest level of function for planned discharge setting.  See evaluation for individualized goals.  Description: FUNCTIONAL STATUS PRIOR TO ADMISSION:  Patient with limited cognition (A&O x2), unreliable historian. Per his report, he lives with his significant other, IND with no AD use, no longer driving.     Occupational Therapy Goals:  Initiated 2/15/2025  1.  Patient will perform grooming at the sink with Minimal Assist within 7 day(s).  2.  Patient will perform bathing with Minimal Assist within 7 day(s).  3.  Patient will perform lower body dressing with Moderate Assist within 7 day(s).  4.  Patient will perform toilet transfers to/from bathroom with Minimal Assist within 7 day(s).  5.  Patient will perform all aspects of toileting with Moderate Assist within 7 day(s).  6.  Patient will participate in upper extremity therapeutic exercise/activities with Standby Assist for min minutes within 7 day(s).    7.  Patient will utilize energy conservation techniques during functional activities with verbal & visual cues within 7 day(s).    Outcome: Progressing     OCCUPATIONAL THERAPY EVALUATION    Patient: Benson Paez (84 y.o. male)  Date: 2/15/2025  Primary Diagnosis: Severe sepsis (HCC) [A41.9, R65.20]         Precautions: Fall Risk                  ASSESSMENT :  The patient is limited by decreased functional mobility, independence in ADLs, high-level IADLs, ROM, strength, endurance, cognition (orientation, safety awareness, command following, attention/concentration, sequencing, insight, judgement, impulsivity., dual tasking), coordination, balance, and continence s/p admission for sepsis d/t AMS, hypoxia, hypotension, & nausea/vomiting.    He presents below his reported IND baseline, requiring Min-Max A for BADLs and Min A for limited OOB mobility with toileting, LB bathing, dressing, and BSC transfers this session. He

## 2025-02-15 NOTE — H&P
History & Physical    Primary Care Provider: Sally Cabral MD  Source of Information: Patient and chart review    History of Presenting Illness:   Benson Paez is a 84 y.o. male with hx of dementia, htn, gerd who presented to ed via ems for unconsciousness and hypoxia.  Per chart review, ems was activated to his home for AMS.  Who was reported found unconscious in his bathroom with evidence of recent emesis and noted to be hypoxic and hypotensive.    The patient denies any fever, chills, chest or abdominal pain, nausea, vomiting, cough, congestion, recent illness, palpitations, or dysuria.    Remarkable vitals on ER Presentation: hr to 109, bp to 60s/40s  Labs Remarkable for: wbc 18.5, LA 2.54, cr 1.5  ER Images: ct head: no acute process.  CT chest, abd et pelvis: no acute process  ER Rx: zosyn, 2.2l ns bolus, zofran     Review of Systems:  Pertinent items are noted in the History of Present Illness.     Past Medical History:   Diagnosis Date    GERD (gastroesophageal reflux disease)     Hypertension     Right inguinal hernia 05/27/2021      Past Surgical History:   Procedure Laterality Date    CATARACT REMOVAL Bilateral     CHOLECYSTECTOMY      COLONOSCOPY  5/2013    with polyps    COLONOSCOPY Left 11/20/2017    COLONOSCOPY performed by Shane Diamond MD at [facility] ENDOSCOPY     Prior to Admission medications    Medication Sig Start Date End Date Taking? Authorizing Provider   aspirin 81 MG chewable tablet Take by mouth    Automatic Reconciliation, Ar   calcium carbonate (OS-PAMELA) 1250 (500 Ca) MG chewable tablet Take 1 tablet by mouth as needed    Automatic Reconciliation, Ar   lisinopril (PRINIVIL;ZESTRIL) 40 MG tablet Take 1 tablet by mouth every morning    Automatic Reconciliation, Ar   omeprazole (PRILOSEC) 10 MG delayed release capsule Take by mouth    Automatic Reconciliation, Ar     No Known Allergies   Family History   Problem Relation Age of Onset    No Known Problems Son     Anesth Problems Neg Hx

## 2025-02-15 NOTE — PROGRESS NOTES
Occupational Therapy  02/15/25    Orders received, chart reviewed and patient evaluated by occupational therapy. Pending progression with skilled acute occupational therapy, recommend:    Moderate intensity short-term skilled occupational therapy up to 5x/week    Recommend with nursing patient to complete as able in order to maintain strength, endurance and independence: OOB to chair 3x/day, ADLs with assist, and performing toileting to BSC with 1 assist. Thank you for your assistance.     Full evaluation to follow.     Thank you,   TREVOR Ruth, OTR/KRUPA

## 2025-02-15 NOTE — PROGRESS NOTES
1600 Dr. Mcgill ordered bedside eval. for pt's diet.  1700 Dysphasia diet ordered from the eval    Bedside and Verbal shift change report given to Victorina (oncoming nurse) by Michelle (offgoing nurse). Report included the following information Nurse Handoff Report, Index, Intake/Output, MAR, and Cardiac Rhythm SR/ST .

## 2025-02-15 NOTE — PLAN OF CARE
Problem: Physical Therapy - Adult  Goal: By Discharge: Performs mobility at highest level of function for planned discharge setting.  See evaluation for individualized goals.  Description: FUNCTIONAL STATUS PRIOR TO ADMISSION: Pt is a poor historian. Reports he was independent with functional mobility prior to admission.    HOME SUPPORT PRIOR TO ADMISSION: Pt reports he lives with a significant other. D/t cognition, unable to state if she provides assistance for ADLs/IADLs.    Physical Therapy Goals  Initiated 2/15/2025  1.  Patient will move from supine to sit and sit to supine in bed with independence within 7 day(s).    2.  Patient will perform sit to stand with modified independence within 7 day(s).  3.  Patient will transfer from bed to chair and chair to bed with modified independence using the least restrictive device within 7 day(s).  4.  Patient will ambulate with supervision/set-up for 100 feet with the least restrictive device within 7 day(s).   5.  Patient will ascend/descend 5 stairs with  handrail(s) with supervision/set-up within 7 day(s).   Outcome: Progressing     PHYSICAL THERAPY EVALUATION    Patient: Benson Paez (84 y.o. male)  Date: 2/15/2025  Primary Diagnosis: Severe sepsis (HCC) [A41.9, R65.20]       Precautions: Restrictions/Precautions: Fall Risk                      ASSESSMENT :   DEFICITS/IMPAIRMENTS:   The patient is limited by decreased functional mobility, independence in ADLs, strength, body mechanics, activity tolerance, safety awareness, cognition, command following, attention/concentration, balance.  Pt seen for PT evaluation after presenting to hospital with AMS, found unconscious in bathroom and noted to be hypoxic and hypotensive. Pt received standing at bedside, pleasantly confused, with bowel incontinence. Assisted to BSC with Evaristo, requiring Max Vc'ing for sequencing. Requires demonstration and simple Vc'ing to perform basic hygiene tasks on commode. Returned to bed at  of Home: House  Home Layout: Two level  Has the patient had two or more falls in the past year or any fall with injury in the past year?: Unknown  Prior Level of Assist for ADLs: Independent  Prior Level of Assist for Ambulation: Independent household ambulator, with or without device  Prior Level of Assist for Transfers: Independent  Active : No    Cognitive/Behavioral Status:  Orientation  Overall Orientation Status: Impaired  Orientation Level: Oriented to place;Oriented to person;Disoriented to time;Disoriented to situation  Cognition  Overall Cognitive Status: Exceptions  Arousal/Alertness: Appropriate responses to stimuli;Delayed responses to stimuli  Following Commands: Follows one step commands with increased time;Follows one step commands with repetition;Inconsistently follows commands  Attention Span: Attends with cues to redirect;Difficulty attending to directions;Difficulty dividing attention  Memory: Decreased recall of precautions;Decreased recall of recent events;Decreased short term memory  Safety Judgement: Decreased awareness of need for assistance;Decreased awareness of need for safety  Problem Solving: Assistance required to generate solutions;Assistance required to correct errors made;Assistance required to identify errors made;Assistance required to implement solutions;Decreased awareness of errors  Insights: Decreased awareness of deficits  Initiation: Requires cues for all  Sequencing: Requires cues for all    Skin: Visible skin intact      Hearing:   Hearing  Hearing: Exceptions to WFL  Hearing Exceptions: Hard of hearing/hearing concerns;Bilateral hearing aid (not worn during session)    Vision/Perceptual:                Vision  Vision: Impaired  Tracking: Unable to test secondary to decreased visual attention     Strength:    Strength: Generally decreased, functional         Range Of Motion:  AROM: Generally decreased, functional  PROM: Generally decreased, functional    Functional

## 2025-02-15 NOTE — ED NOTES
Pt arrived from home via EMS on a nonrebreather @10L; RT placed pt on heated high flow @15L; pt has fluids on pressure bags along with 4.5 zosyn running.  I was not able to get a reading on his temp orally so I had to get it rectally and it was 98 degrees F; straight cath pt for urine sample and there was some resistance but the pt coughed and the catheter went straight through.    Pt started vomiting after CT, while trying to get him still for the EKG; he is overall looking and sounding better.

## 2025-02-15 NOTE — ED NOTES
TRANSFER - OUT REPORT:    Verbal report given to Michelle on Benson Paez  being transferred to [facility] for routine progression of patient care       Report consisted of patient's Situation, Background, Assessment and   Recommendations(SBAR).     Information from the following report(s) Nurse Handoff Report, Index, ED Encounter Summary, ED SBAR, Adult Overview, MAR, and Event Log was reviewed with the receiving nurse.    [place] Fall Assessment:    Presents to emergency department  because of falls (Syncope, seizure, or loss of consciousness): No  Age > 70: Yes  Altered Mental Status, Intoxication with alcohol or substance confusion (Disorientation, impaired judgment, poor safety awaremess, or inability to follow instructions): Yes  Impaired Mobility: Ambulates or transfers with assistive devices or assistance; Unable to ambulate or transer.: Yes  Nursing Judgement: Yes          Lines:   Peripheral IV 02/15/25 Right Antecubital (Active)       Peripheral IV 02/15/25 Left Antecubital (Active)   Site Assessment Dry;Intact 02/15/25 0535   Line Status Blood return noted;Flushed;Normal saline locked 02/15/25 0535        Opportunity for questions and clarification was provided.      Patient transported with:  Monitor and Registered Nurse

## 2025-02-16 LAB
BASOPHILS # BLD: 0.04 K/UL (ref 0–0.1)
BASOPHILS NFR BLD: 0.6 % (ref 0–1)
DIFFERENTIAL METHOD BLD: ABNORMAL
EOSINOPHIL # BLD: 0.13 K/UL (ref 0–0.4)
EOSINOPHIL NFR BLD: 1.8 % (ref 0–7)
ERYTHROCYTE [DISTWIDTH] IN BLOOD BY AUTOMATED COUNT: 13.2 % (ref 11.5–14.5)
HCT VFR BLD AUTO: 33.2 % (ref 36.6–50.3)
HGB BLD-MCNC: 11.3 G/DL (ref 12.1–17)
IMM GRANULOCYTES # BLD AUTO: 0.02 K/UL (ref 0–0.04)
IMM GRANULOCYTES NFR BLD AUTO: 0.3 % (ref 0–0.5)
LYMPHOCYTES # BLD: 1.74 K/UL (ref 0.8–3.5)
LYMPHOCYTES NFR BLD: 24.6 % (ref 12–49)
MCH RBC QN AUTO: 29.7 PG (ref 26–34)
MCHC RBC AUTO-ENTMCNC: 34 G/DL (ref 30–36.5)
MCV RBC AUTO: 87.1 FL (ref 80–99)
MONOCYTES # BLD: 0.77 K/UL (ref 0–1)
MONOCYTES NFR BLD: 10.9 % (ref 5–13)
NEUTS SEG # BLD: 4.38 K/UL (ref 1.8–8)
NEUTS SEG NFR BLD: 61.8 % (ref 32–75)
NRBC # BLD: 0 K/UL (ref 0–0.01)
NRBC BLD-RTO: 0 PER 100 WBC
PLATELET # BLD AUTO: 218 K/UL (ref 150–400)
PMV BLD AUTO: 10 FL (ref 8.9–12.9)
RBC # BLD AUTO: 3.81 M/UL (ref 4.1–5.7)
WBC # BLD AUTO: 7.1 K/UL (ref 4.1–11.1)

## 2025-02-16 PROCEDURE — 85025 COMPLETE CBC W/AUTO DIFF WBC: CPT

## 2025-02-16 PROCEDURE — 6360000002 HC RX W HCPCS: Performed by: STUDENT IN AN ORGANIZED HEALTH CARE EDUCATION/TRAINING PROGRAM

## 2025-02-16 PROCEDURE — 92610 EVALUATE SWALLOWING FUNCTION: CPT

## 2025-02-16 PROCEDURE — 6370000000 HC RX 637 (ALT 250 FOR IP): Performed by: HOSPITALIST

## 2025-02-16 PROCEDURE — 2580000003 HC RX 258: Performed by: STUDENT IN AN ORGANIZED HEALTH CARE EDUCATION/TRAINING PROGRAM

## 2025-02-16 PROCEDURE — 6370000000 HC RX 637 (ALT 250 FOR IP): Performed by: STUDENT IN AN ORGANIZED HEALTH CARE EDUCATION/TRAINING PROGRAM

## 2025-02-16 PROCEDURE — 6360000002 HC RX W HCPCS: Performed by: HOSPITALIST

## 2025-02-16 PROCEDURE — 1100000000 HC RM PRIVATE

## 2025-02-16 PROCEDURE — 92523 SPEECH SOUND LANG COMPREHEN: CPT

## 2025-02-16 PROCEDURE — 2500000003 HC RX 250 WO HCPCS: Performed by: STUDENT IN AN ORGANIZED HEALTH CARE EDUCATION/TRAINING PROGRAM

## 2025-02-16 RX ORDER — HALOPERIDOL 5 MG/ML
2 INJECTION INTRAMUSCULAR ONCE
Status: COMPLETED | OUTPATIENT
Start: 2025-02-16 | End: 2025-02-16

## 2025-02-16 RX ORDER — HALOPERIDOL 5 MG/ML
2 INJECTION INTRAMUSCULAR EVERY 6 HOURS PRN
Status: DISCONTINUED | OUTPATIENT
Start: 2025-02-16 | End: 2025-02-16

## 2025-02-16 RX ORDER — HALOPERIDOL 5 MG/ML
2 INJECTION INTRAMUSCULAR ONCE
Status: DISCONTINUED | OUTPATIENT
Start: 2025-02-16 | End: 2025-02-16

## 2025-02-16 RX ORDER — TRAZODONE HYDROCHLORIDE 50 MG/1
50 TABLET ORAL
Status: DISCONTINUED | OUTPATIENT
Start: 2025-02-16 | End: 2025-02-19 | Stop reason: HOSPADM

## 2025-02-16 RX ADMIN — HEPARIN SODIUM 5000 UNITS: 5000 INJECTION INTRAVENOUS; SUBCUTANEOUS at 23:23

## 2025-02-16 RX ADMIN — SODIUM CHLORIDE: 9 INJECTION, SOLUTION INTRAVENOUS at 01:21

## 2025-02-16 RX ADMIN — HEPARIN SODIUM 5000 UNITS: 5000 INJECTION INTRAVENOUS; SUBCUTANEOUS at 15:49

## 2025-02-16 RX ADMIN — AMOXICILLIN AND CLAVULANATE POTASSIUM 1 TABLET: 875; 125 TABLET, FILM COATED ORAL at 21:51

## 2025-02-16 RX ADMIN — SODIUM CHLORIDE, PRESERVATIVE FREE 10 ML: 5 INJECTION INTRAVENOUS at 08:26

## 2025-02-16 RX ADMIN — PANTOPRAZOLE SODIUM 20 MG: 20 TABLET, DELAYED RELEASE ORAL at 15:50

## 2025-02-16 RX ADMIN — ASPIRIN 81 MG CHEWABLE TABLET 81 MG: 81 TABLET CHEWABLE at 08:25

## 2025-02-16 RX ADMIN — PIPERACILLIN AND TAZOBACTAM 3375 MG: 3; .375 INJECTION, POWDER, LYOPHILIZED, FOR SOLUTION INTRAVENOUS at 02:00

## 2025-02-16 RX ADMIN — SODIUM CHLORIDE, PRESERVATIVE FREE 10 ML: 5 INJECTION INTRAVENOUS at 21:54

## 2025-02-16 RX ADMIN — HALOPERIDOL LACTATE 2 MG: 5 INJECTION, SOLUTION INTRAMUSCULAR at 12:28

## 2025-02-16 RX ADMIN — TRAZODONE HYDROCHLORIDE 50 MG: 50 TABLET ORAL at 21:51

## 2025-02-16 RX ADMIN — Medication 1.5 MG: at 21:51

## 2025-02-16 RX ADMIN — HEPARIN SODIUM 5000 UNITS: 5000 INJECTION INTRAVENOUS; SUBCUTANEOUS at 06:05

## 2025-02-16 RX ADMIN — PIPERACILLIN AND TAZOBACTAM 3375 MG: 3; .375 INJECTION, POWDER, LYOPHILIZED, FOR SOLUTION INTRAVENOUS at 08:33

## 2025-02-16 RX ADMIN — HALOPERIDOL LACTATE 2 MG: 5 INJECTION, SOLUTION INTRAMUSCULAR at 17:36

## 2025-02-16 RX ADMIN — PANTOPRAZOLE SODIUM 20 MG: 20 TABLET, DELAYED RELEASE ORAL at 06:05

## 2025-02-16 ASSESSMENT — PAIN SCALES - GENERAL
PAINLEVEL_OUTOF10: 0
PAINLEVEL_OUTOF10: 0

## 2025-02-16 NOTE — PROGRESS NOTES
4 Eyes Skin Assessment     NAME:  Benson Paez  YOB: 1940  MEDICAL RECORD NUMBER:  [id number]    The patient is being assessed for  Admission    I agree that at least one RN has performed a thorough Head to Toe Skin Assessment on the patient. ALL assessment sites listed below have been assessed.      Areas assessed by both nurses:    Head, Face, Ears, Shoulders, Back, Chest, Arms, Elbows, Hands, Sacrum. Buttock, Coccyx, Ischium, Legs. Feet and Heels, and Under Medical Devices         Does the Patient have a Wound? No noted wound(s)       Guillaume Prevention initiated by RN: No  Wound Care Orders initiated by RN: No    Pressure Injury (Stage 3,4, Unstageable, DTI, NWPT, and Complex wounds) if present, place Wound referral order by RN under : No    New Ostomies, if present place, Ostomy referral order under : No     Nurse 1 eSignature: Electronically signed by Nehemias Campos RN on 2/15/25 at 8:04 PM EST    **SHARE this note so that the co-signing nurse can place an eSignature**    Nurse 2 eSignature: {Esignature:[id number]}

## 2025-02-16 NOTE — PROGRESS NOTES
Speech LAnguage Pathology EVALUATION/DISCHARGE    Patient: Benson Paez (84 y.o. male)  Date: 2/16/2025  Primary Diagnosis: Lactic acidosis [E87.20]  Unresponsive episode [R40.4]  AGUSTINA (acute kidney injury) (HCC) [N17.9]  Severe sepsis (HCC) [A41.9, R65.20]  Hypotension, unspecified hypotension type [I95.9]  Abdominal pain with vomiting [R10.9, R11.10]       Precautions:  Fall Risk                  ASSESSMENT :  Patient seen for bedside swallow evaluation and functional cognitive intervention. Patient presents with functional oropharyngeal phases of swallow with no overt s/s of aspiration present nor overt difficulty appreciated. Patient does have a slightly prolonged mastication time likely due to cognitive deficits, but otherwise functional. Suspect concern for aspiration pneumonia is likely more of a pneumonitis due to recent vomiting. Recommend pt initiate baseline diet.     Additionally, patient having some concerns for agitation and requiring a sitter and thus spent some time doing functional cognitive tasks to help the patient calm. Ensured that the patient's blinds were open, PCT helped to face chair towards the window, provided pt with an activity apron, turned off the television, and played 1940s big band music. Kept things low key and patient was able to easily calm down and listen to music. He began talking with SLP about how much he enjoys big band music and how he loves to go and listen to bands. Strongly encourage limiting stimulation and keeping a calm atmosphere for this patient with dementia. Discussed with PCT, appreciate his assistance. No further acute SLP needs anticipated. Will sign off. Please reconsult should SLP be of any assistance.     Patient will be discharged from skilled speech-language pathology services at this time.     PLAN :  Recommendations and Planned Interventions:  Diet: Regular and thin liquids  General aspiration precautions  Limit stimulation in general and try to keep

## 2025-02-16 NOTE — PLAN OF CARE
Problem: Safety - Adult  Goal: Free from fall injury  Outcome: Progressing     Problem: Discharge Planning  Goal: Discharge to home or other facility with appropriate resources  Outcome: Progressing  Flowsheets  Taken 2/16/2025 0800 by Yuko Forman RN  Discharge to home or other facility with appropriate resources: Identify barriers to discharge with patient and caregiver  Taken 2/15/2025 2000 by Victorina Rosenberg RN  Discharge to home or other facility with appropriate resources: Identify barriers to discharge with patient and caregiver     Problem: Pain  Goal: Verbalizes/displays adequate comfort level or baseline comfort level  Outcome: Progressing  Flowsheets (Taken 2/16/2025 0800)  Verbalizes/displays adequate comfort level or baseline comfort level: Encourage patient to monitor pain and request assistance     Problem: Skin/Tissue Integrity  Goal: Skin integrity remains intact  Description: 1.  Monitor for areas of redness and/or skin breakdown  2.  Assess vascular access sites hourly  3.  Every 4-6 hours minimum:  Change oxygen saturation probe site  4.  Every 4-6 hours:  If on nasal continuous positive airway pressure, respiratory therapy assess nares and determine need for appliance change or resting period  Outcome: Progressing

## 2025-02-16 NOTE — PROGRESS NOTES
[facility]                                                                                          Hospitalist Progress Note  Gaston Mcgill MD  Office Phone: [phone]        Date of Service:  2025  NAME:  Benson Paez  :  1940  MRN:  [id number]       Admission Summary:   84 y.o. male with hx of dementia, htn, gerd who presented to ed via ems for unconsciousness and hypoxia.  Per chart review, ems was activated to his home for AMS.  Who was reported found unconscious in his bathroom with evidence of recent emesis and noted to be hypoxic and hypotensive.       Interval history / Subjective:   On room air this AM  Didn't sleep all night, more confused and restless this AM  Son at bedside, discussed plan at length     Assessment & Plan:     Acute respiratory failure with hypoxia  Suspected aspiration  Severe sepsis  -change Zosyn to Augmentin  -off O2  -unclear what precipitated event, pt swallowing well, sounds like he got ill and vomited at home and likely aspirated as a result    Dementia with behavioral disturbances  -d/c tele  -trazodone and melatonin tonight    HTN:  -resume lisinopril when appropriate    GERD         Code status: full  Prophylaxis: sc heparin  Care Plan discussed with: pt/family/rn   Anticipated Disposition: needs more supervision at home  Inpatient  Cardiac monitoring: Telemetry  Central Line:            Social Determinants of Health     Tobacco Use: Medium Risk (2021)    [facility]  (prior to 2023), [facility]  (prior to 2023)    Patient History     Smoking Tobacco Use: Former     Smokeless Tobacco Use: Never     Passive Exposure: Not on file   Alcohol Use: Not on file   Financial Resource Strain: Not on file   Food Insecurity: Not on file   Transportation Needs: Not on file   Physical Activity: Not on file   Stress: Not on file   Social Connections: Not on file  tablet 4 mg  4 mg Oral Q8H PRN    Or    ondansetron (ZOFRAN) injection 4 mg  4 mg IntraVENous Q6H PRN    polyethylene glycol (GLYCOLAX) packet 17 g  17 g Oral Daily PRN    acetaminophen (TYLENOL) tablet 650 mg  650 mg Oral Q6H PRN    Or    acetaminophen (TYLENOL) suppository 650 mg  650 mg Rectal Q6H PRN    heparin (porcine) injection 5,000 Units  5,000 Units SubCUTAneous Q8H     ______________________________________________________________________  EXPECTED LENGTH OF STAY: 3  ACTUAL LENGTH OF STAY:          1                 Gaston Mcgill MD

## 2025-02-17 LAB
ANION GAP SERPL CALC-SCNC: 7 MMOL/L (ref 2–12)
BASOPHILS # BLD: 0.05 K/UL (ref 0–0.1)
BASOPHILS NFR BLD: 0.8 % (ref 0–1)
BUN SERPL-MCNC: 9 MG/DL (ref 6–20)
BUN/CREAT SERPL: 8 (ref 12–20)
CALCIUM SERPL-MCNC: 8.6 MG/DL (ref 8.5–10.1)
CHLORIDE SERPL-SCNC: 110 MMOL/L (ref 97–108)
CO2 SERPL-SCNC: 23 MMOL/L (ref 21–32)
CREAT SERPL-MCNC: 1.08 MG/DL (ref 0.7–1.3)
DIFFERENTIAL METHOD BLD: ABNORMAL
EOSINOPHIL # BLD: 0.22 K/UL (ref 0–0.4)
EOSINOPHIL NFR BLD: 3.4 % (ref 0–7)
ERYTHROCYTE [DISTWIDTH] IN BLOOD BY AUTOMATED COUNT: 12.8 % (ref 11.5–14.5)
GLUCOSE SERPL-MCNC: 79 MG/DL (ref 65–100)
HCT VFR BLD AUTO: 33.8 % (ref 36.6–50.3)
HGB BLD-MCNC: 11.6 G/DL (ref 12.1–17)
IMM GRANULOCYTES # BLD AUTO: 0.01 K/UL (ref 0–0.04)
IMM GRANULOCYTES NFR BLD AUTO: 0.2 % (ref 0–0.5)
LYMPHOCYTES # BLD: 1.84 K/UL (ref 0.8–3.5)
LYMPHOCYTES NFR BLD: 28.4 % (ref 12–49)
MAGNESIUM SERPL-MCNC: 2.1 MG/DL (ref 1.6–2.4)
MCH RBC QN AUTO: 30.4 PG (ref 26–34)
MCHC RBC AUTO-ENTMCNC: 34.3 G/DL (ref 30–36.5)
MCV RBC AUTO: 88.5 FL (ref 80–99)
MONOCYTES # BLD: 0.82 K/UL (ref 0–1)
MONOCYTES NFR BLD: 12.6 % (ref 5–13)
NEUTS SEG # BLD: 3.55 K/UL (ref 1.8–8)
NEUTS SEG NFR BLD: 54.6 % (ref 32–75)
NRBC # BLD: 0 K/UL (ref 0–0.01)
NRBC BLD-RTO: 0 PER 100 WBC
PLATELET # BLD AUTO: 212 K/UL (ref 150–400)
PMV BLD AUTO: 10.1 FL (ref 8.9–12.9)
POTASSIUM SERPL-SCNC: 3.6 MMOL/L (ref 3.5–5.1)
RBC # BLD AUTO: 3.82 M/UL (ref 4.1–5.7)
SODIUM SERPL-SCNC: 140 MMOL/L (ref 136–145)
WBC # BLD AUTO: 6.5 K/UL (ref 4.1–11.1)

## 2025-02-17 PROCEDURE — 2500000003 HC RX 250 WO HCPCS: Performed by: STUDENT IN AN ORGANIZED HEALTH CARE EDUCATION/TRAINING PROGRAM

## 2025-02-17 PROCEDURE — 6360000002 HC RX W HCPCS: Performed by: STUDENT IN AN ORGANIZED HEALTH CARE EDUCATION/TRAINING PROGRAM

## 2025-02-17 PROCEDURE — 85025 COMPLETE CBC W/AUTO DIFF WBC: CPT

## 2025-02-17 PROCEDURE — 1100000000 HC RM PRIVATE

## 2025-02-17 PROCEDURE — 83735 ASSAY OF MAGNESIUM: CPT

## 2025-02-17 PROCEDURE — 97530 THERAPEUTIC ACTIVITIES: CPT | Performed by: PHYSICAL THERAPIST

## 2025-02-17 PROCEDURE — 6370000000 HC RX 637 (ALT 250 FOR IP): Performed by: HOSPITALIST

## 2025-02-17 PROCEDURE — 97535 SELF CARE MNGMENT TRAINING: CPT

## 2025-02-17 PROCEDURE — 97116 GAIT TRAINING THERAPY: CPT | Performed by: PHYSICAL THERAPIST

## 2025-02-17 PROCEDURE — 6370000000 HC RX 637 (ALT 250 FOR IP): Performed by: STUDENT IN AN ORGANIZED HEALTH CARE EDUCATION/TRAINING PROGRAM

## 2025-02-17 PROCEDURE — 80048 BASIC METABOLIC PNL TOTAL CA: CPT

## 2025-02-17 RX ADMIN — PANTOPRAZOLE SODIUM 20 MG: 20 TABLET, DELAYED RELEASE ORAL at 06:50

## 2025-02-17 RX ADMIN — AMOXICILLIN AND CLAVULANATE POTASSIUM 1 TABLET: 875; 125 TABLET, FILM COATED ORAL at 08:58

## 2025-02-17 RX ADMIN — AMOXICILLIN AND CLAVULANATE POTASSIUM 1 TABLET: 875; 125 TABLET, FILM COATED ORAL at 21:01

## 2025-02-17 RX ADMIN — SODIUM CHLORIDE, PRESERVATIVE FREE 10 ML: 5 INJECTION INTRAVENOUS at 21:03

## 2025-02-17 RX ADMIN — HEPARIN SODIUM 5000 UNITS: 5000 INJECTION INTRAVENOUS; SUBCUTANEOUS at 23:10

## 2025-02-17 RX ADMIN — HEPARIN SODIUM 5000 UNITS: 5000 INJECTION INTRAVENOUS; SUBCUTANEOUS at 06:54

## 2025-02-17 RX ADMIN — Medication 1.5 MG: at 20:59

## 2025-02-17 RX ADMIN — SODIUM CHLORIDE, PRESERVATIVE FREE 10 ML: 5 INJECTION INTRAVENOUS at 08:58

## 2025-02-17 RX ADMIN — HEPARIN SODIUM 5000 UNITS: 5000 INJECTION INTRAVENOUS; SUBCUTANEOUS at 14:57

## 2025-02-17 RX ADMIN — PANTOPRAZOLE SODIUM 20 MG: 20 TABLET, DELAYED RELEASE ORAL at 14:57

## 2025-02-17 RX ADMIN — TRAZODONE HYDROCHLORIDE 50 MG: 50 TABLET ORAL at 21:01

## 2025-02-17 RX ADMIN — ASPIRIN 81 MG CHEWABLE TABLET 81 MG: 81 TABLET CHEWABLE at 08:58

## 2025-02-17 NOTE — PLAN OF CARE
Problem: Occupational Therapy - Adult  Goal: By Discharge: Performs self-care activities at highest level of function for planned discharge setting.  See evaluation for individualized goals.  Description: FUNCTIONAL STATUS PRIOR TO ADMISSION:  Patient with limited cognition (A&O x2), unreliable historian. Per his report, he lives with his significant other, IND with no AD use, no longer driving.     Occupational Therapy Goals:  Initiated 2/15/2025  1.  Patient will perform grooming at the sink with Minimal Assist within 7 day(s).  2.  Patient will perform bathing with Minimal Assist within 7 day(s).  3.  Patient will perform lower body dressing with Moderate Assist within 7 day(s).  4.  Patient will perform toilet transfers to/from bathroom with Minimal Assist within 7 day(s).  5.  Patient will perform all aspects of toileting with Moderate Assist within 7 day(s).  6.  Patient will participate in upper extremity therapeutic exercise/activities with Standby Assist for min minutes within 7 day(s).    7.  Patient will utilize energy conservation techniques during functional activities with verbal & visual cues within 7 day(s).    Outcome: Progressing   OCCUPATIONAL THERAPY TREATMENT  Patient: Benson Paez (84 y.o. male)  Date: 2/17/2025  Primary Diagnosis: Lactic acidosis [E87.20]  Unresponsive episode [R40.4]  AGUSTINA (acute kidney injury) (HCC) [N17.9]  Severe sepsis (HCC) [A41.9, R65.20]  Hypotension, unspecified hypotension type [I95.9]  Abdominal pain with vomiting [R10.9, R11.10]       Precautions: Fall Risk                Chart, occupational therapy assessment, plan of care, and goals were reviewed.    ASSESSMENT  Patient continues to benefit from skilled OT services and is progressing towards goals. Pt presented in bathroom with PT present. Pt remains pleasantly confused and able to follow commands throughout session. Pt progressing with functional reach to LE for LB dressing, dynamic sitting balance, sequencing  with less cues, and increased participation in standing BADLs. Pt returned to chair at end of session with family present and all needs met. Pt remains below his baseline with cognition, balance, and I with BADLs. Pt would benefit from rehab at discharge to maximize return to PLOF of independent.       PLAN :  Patient continues to benefit from skilled intervention to address the above impairments.  Continue treatment per established plan of care to address goals.    Recommend with staff: OOB to chair 3x/day CGA HHA, functional mobility to bathroom for toileting CGA HHA    Recommendation for discharge: (in order for the patient to meet his/her long term goals):   Moderate intensity short-term skilled occupational therapy up to 5x/week    Other factors to consider for discharge: patient's current support system is unable to meet their requirements for physical assistance, high risk for falls, not safe to be alone, and concern for safely navigating or managing the home environment    IF patient discharges home will need the following DME: continuing to assess with progress     SUBJECTIVE:   Patient stated “If I stay here a month I'll be telling you what to do.”    OBJECTIVE DATA SUMMARY:   Cognitive/Behavioral Status:  Orientation  Overall Orientation Status: Impaired  Orientation Level: Oriented to person;Oriented to place  Cognition  Overall Cognitive Status: Exceptions  Arousal/Alertness: Appropriate responses to stimuli  Following Commands: Follows one step commands with repetition  Attention Span: Attends with cues to redirect  Safety Judgement: Impaired;Decreased awareness of need for safety  Problem Solving: Decreased awareness of errors  Insights: Decreased awareness of deficits  Initiation: Requires cues for some  Sequencing: Requires cues for some    Functional Mobility and Transfers for ADLs:  Bed Mobility:  Bed Mobility Training  Bed Mobility Training: No  Overall Level of Assistance: Supervision or  of Care;Precautions;Transfer Training;ADL Adaptive Strategies  Education Method: Verbal  Barriers to Learning: Cognition  Education Outcome: Demonstrated understanding;Continued education needed    Thank you for this referral.  Christie Almaguer OT  Minutes: 15

## 2025-02-17 NOTE — PLAN OF CARE
Problem: Safety - Adult  Goal: Free from fall injury  2/17/2025 0935 by Sonia Purdy RN  Outcome: Progressing  2/17/2025 0349 by Afua Reno RN  Outcome: Progressing  2/17/2025 0349 by Afua Reno RN  Outcome: Progressing     Problem: Discharge Planning  Goal: Discharge to home or other facility with appropriate resources  2/17/2025 0935 by Sonia Purdy RN  Outcome: Progressing  2/17/2025 0349 by Afua Reno RN  Outcome: Progressing  2/17/2025 0349 by Afua Reno RN  Outcome: Progressing     Problem: Pain  Goal: Verbalizes/displays adequate comfort level or baseline comfort level  2/17/2025 0935 by Sonia Purdy RN  Outcome: Progressing  2/17/2025 0349 by Afua Reno RN  Outcome: Progressing  2/17/2025 0349 by Afua Reno RN  Outcome: Progressing     Problem: Skin/Tissue Integrity  Goal: Skin integrity remains intact  Description: 1.  Monitor for areas of redness and/or skin breakdown  2.  Assess vascular access sites hourly  3.  Every 4-6 hours minimum:  Change oxygen saturation probe site  4.  Every 4-6 hours:  If on nasal continuous positive airway pressure, respiratory therapy assess nares and determine need for appliance change or resting period  2/17/2025 0935 by Sonia Purdy RN  Outcome: Progressing  2/17/2025 0349 by Afua Reno RN  Outcome: Progressing  2/17/2025 0349 by Afua Reno RN  Outcome: Progressing

## 2025-02-17 NOTE — PLAN OF CARE
Problem: Physical Therapy - Adult  Goal: By Discharge: Performs mobility at highest level of function for planned discharge setting.  See evaluation for individualized goals.  Description: FUNCTIONAL STATUS PRIOR TO ADMISSION: Pt is a poor historian. Reports he was independent with functional mobility prior to admission.    HOME SUPPORT PRIOR TO ADMISSION: Pt reports he lives with a significant other. D/t cognition, unable to state if she provides assistance for ADLs/IADLs.    Physical Therapy Goals  Initiated 2/15/2025  1.  Patient will move from supine to sit and sit to supine in bed with independence within 7 day(s).    2.  Patient will perform sit to stand with modified independence within 7 day(s).  3.  Patient will transfer from bed to chair and chair to bed with modified independence using the least restrictive device within 7 day(s).  4.  Patient will ambulate with supervision/set-up for 100 feet with the least restrictive device within 7 day(s).   5.  Patient will ascend/descend 5 stairs with  handrail(s) with supervision/set-up within 7 day(s).   Outcome: Progressing  PHYSICAL THERAPY TREATMENT    Patient: Benson Paez (84 y.o. male)  Date: 2/17/2025  Diagnosis: Lactic acidosis [E87.20]  Unresponsive episode [R40.4]  AGUSTINA (acute kidney injury) (HCC) [N17.9]  Severe sepsis (HCC) [A41.9, R65.20]  Hypotension, unspecified hypotension type [I95.9]  Abdominal pain with vomiting [R10.9, R11.10] Severe sepsis (HCC)      Precautions: Fall Risk                      ASSESSMENT:  Patient continues to benefit from skilled PT services and is progressing towards goals. The patient is in the bed on arrival with his son present.  He is agreeable to ambulate.  He is able to come to sitting with supervision and seated balance is good.  The patient has a soft knee brace on the left.  He ambulated 200 feet with a RW but was incontinent while ambulating.  We were following with the chair so pushed him back to the room and

## 2025-02-17 NOTE — PROGRESS NOTES
[facility]                                                                                          Hospitalist Progress Note  Gaston Mcgill MD  Office Phone: [phone]        Date of Service:  2025  NAME:  Benson Paez  :  1940  MRN:  [id number]       Admission Summary:   84 y.o. male with hx of dementia, htn, gerd who presented to ed via ems for unconsciousness and hypoxia.  Per chart review, ems was activated to his home for AMS.  Who was reported found unconscious in his bathroom with evidence of recent emesis and noted to be hypoxic and hypotensive.       Interval history / Subjective:   Slept more at night  More comfortable/calm this AM     Assessment & Plan:     Acute respiratory failure with hypoxia  Suspected aspiration  Severe sepsis  -changed Zosyn to Augmentin  -off O2  -unclear what precipitated event, pt swallowing well, sounds like he got ill and vomited at home and likely aspirated as a result    Dementia with behavioral disturbances  -d/c tele  -trazodone and melatonin qhs - did better with these    HTN:  -resume lisinopril when appropriate    GERD         Code status: full  Prophylaxis: sc heparin  Care Plan discussed with: pt/family/rn   Anticipated Disposition: needs more supervision at home  Inpatient  Cardiac monitoring: Telemetry  Central Line:            Social Determinants of Health     Tobacco Use: Medium Risk (2021)    [facility]  (prior to [facility]), [facility]  (prior to 2023)    Patient History     Smoking Tobacco Use: Former     Smokeless Tobacco Use: Never     Passive Exposure: Not on file   Alcohol Use: Not on file   Financial Resource Strain: Not on file   Food Insecurity: Not on file   Transportation Needs: Not on file   Physical Activity: Not on file   Stress: Not on file   Social Connections: Not on file   Intimate Partner Violence: Not on file  \"PTP\"   No results for input(s): \"TIBC\" in the last 72 hours.    Invalid input(s): \"FE\", \"PSAT\", \"FERR\"   No results found for: \"RBCF\"   No results for input(s): \"PH\", \"PCO2\", \"PO2\" in the last 72 hours.  No results for input(s): \"CPK\" in the last 72 hours.    Invalid input(s): \"CPKMB\", \"CKNDX\", \"TROIQ\"  No results found for: \"CHOL\", \"CHLST\", \"CHOLV\", \"HDL\", \"HDLC\", \"LDL\", \"LDLC\"  No results found for: \"GLUCPOC\"  [unfilled]    Notes reviewed from all clinical/nonclinical/nursing services involved in patient's clinical care. Care coordination discussions were held with appropriate clinical/nonclinical/ nursing providers based on care coordination needs.         Patients current active Medications were reviewed, considered, added and adjusted based on the clinical condition today.      Home Medications were reconciled to the best of my ability given all available resources at the time of admission. Route is PO if not otherwise noted.      Admission Status:[id number]:::1}      Medications Reviewed:     Current Facility-Administered Medications   Medication Dose Route Frequency    traZODone (DESYREL) tablet 50 mg  50 mg Oral QHS    melatonin tablet 1.5 mg  1.5 mg Oral QHS    amoxicillin-clavulanate (AUGMENTIN) 875-125 MG per tablet 1 tablet  1 tablet Oral 2 times per day    aspirin chewable tablet 81 mg  81 mg Oral Daily    pantoprazole (PROTONIX) tablet 20 mg  20 mg Oral BID AC    sodium chloride flush 0.9 % injection 5-40 mL  5-40 mL IntraVENous 2 times per day    sodium chloride flush 0.9 % injection 5-40 mL  5-40 mL IntraVENous PRN    0.9 % sodium chloride infusion   IntraVENous PRN    potassium chloride (KLOR-CON) extended release tablet 40 mEq  40 mEq Oral PRN    Or    potassium bicarb-citric acid (EFFER-K) effervescent tablet 40 mEq  40 mEq Oral PRN    Or    potassium chloride 10 mEq/100 mL IVPB (Peripheral Line)  10 mEq IntraVENous PRN    magnesium sulfate 2000 mg in 50 mL IVPB premix  2,000 mg IntraVENous PRN     ondansetron (ZOFRAN-ODT) disintegrating tablet 4 mg  4 mg Oral Q8H PRN    Or    ondansetron (ZOFRAN) injection 4 mg  4 mg IntraVENous Q6H PRN    polyethylene glycol (GLYCOLAX) packet 17 g  17 g Oral Daily PRN    acetaminophen (TYLENOL) tablet 650 mg  650 mg Oral Q6H PRN    Or    acetaminophen (TYLENOL) suppository 650 mg  650 mg Rectal Q6H PRN    heparin (porcine) injection 5,000 Units  5,000 Units SubCUTAneous Q8H     ______________________________________________________________________  EXPECTED LENGTH OF STAY: 4  ACTUAL LENGTH OF STAY:          2                 Gaston Mcgill MD

## 2025-02-17 NOTE — CARE COORDINATION
Care Management Initial Assessment       RUR:11%  Readmission? No  1st IM letter given? Yes - 2/15  1st  letter given: No     CM spoke with patient's caregiver, Krystal Zafar to introduce self and role. Patient lives in a 2 story house with his friend/caregiver.  Patient was independent prior to admission and does not drive.    Patient's physical address is [address] [place] [address] [place], [place] [address].    ADLs: Independent  DME: None  PCP follow up: Sally Cabral [phone]  Previous Home Health: None  Previous Skilled Nursing Facility: None  Previous Inpatient Rehab: None  Insurance verified: Medicare A&B  Pharmacy: [facility] [address]  Emergency Contact: Krystal Zafar [phone]    CM will follow patient progress and assist as needed with SERAFIN plan.    02/17/25 0949   Service Assessment   Patient Orientation Alert and Oriented   Cognition Alert   History Provided By Significant Other   Primary Caregiver Friend   Support Systems Spouse/Significant Other   Patient's Healthcare Decision Maker is: Legal Next of Kin   PCP Verified by CM Yes   Last Visit to PCP Within last 6 months   Prior Functional Level Independent in ADLs/IADLs   Current Functional Level Independent in ADLs/IADLs   Can patient return to prior living arrangement Yes   Ability to make needs known: Good   Family able to assist with home care needs: Yes   Would you like for me to discuss the discharge plan with any other family members/significant others, and if so, who? Yes  (Krystal Zafar [phone])   Financial Resources Medicare   Community Resources None   Social/Functional History   Lives With Significant other   Type of Home House   Home Layout Two level   Bathroom Accessibility Not accessible   Home Equipment None   Receives Help From Friend(s)   Prior Level of Assist for ADLs Independent   Prior Level of Assist for Transfers Independent   Active  No   Patient's  Info Krystal Zafar   Mode of Transportation Car   Occupation Retired   Discharge

## 2025-02-18 PROCEDURE — 97116 GAIT TRAINING THERAPY: CPT

## 2025-02-18 PROCEDURE — 97535 SELF CARE MNGMENT TRAINING: CPT | Performed by: OCCUPATIONAL THERAPIST

## 2025-02-18 PROCEDURE — 97530 THERAPEUTIC ACTIVITIES: CPT

## 2025-02-18 PROCEDURE — 6360000002 HC RX W HCPCS: Performed by: STUDENT IN AN ORGANIZED HEALTH CARE EDUCATION/TRAINING PROGRAM

## 2025-02-18 PROCEDURE — 1100000000 HC RM PRIVATE

## 2025-02-18 PROCEDURE — 6370000000 HC RX 637 (ALT 250 FOR IP): Performed by: STUDENT IN AN ORGANIZED HEALTH CARE EDUCATION/TRAINING PROGRAM

## 2025-02-18 PROCEDURE — 6370000000 HC RX 637 (ALT 250 FOR IP): Performed by: HOSPITALIST

## 2025-02-18 PROCEDURE — 2500000003 HC RX 250 WO HCPCS: Performed by: STUDENT IN AN ORGANIZED HEALTH CARE EDUCATION/TRAINING PROGRAM

## 2025-02-18 RX ORDER — LISINOPRIL 20 MG/1
40 TABLET ORAL EVERY MORNING
Status: DISCONTINUED | OUTPATIENT
Start: 2025-02-18 | End: 2025-02-19 | Stop reason: HOSPADM

## 2025-02-18 RX ADMIN — HEPARIN SODIUM 5000 UNITS: 5000 INJECTION INTRAVENOUS; SUBCUTANEOUS at 16:08

## 2025-02-18 RX ADMIN — SODIUM CHLORIDE, PRESERVATIVE FREE 10 ML: 5 INJECTION INTRAVENOUS at 20:13

## 2025-02-18 RX ADMIN — HEPARIN SODIUM 5000 UNITS: 5000 INJECTION INTRAVENOUS; SUBCUTANEOUS at 23:49

## 2025-02-18 RX ADMIN — AMOXICILLIN AND CLAVULANATE POTASSIUM 1 TABLET: 875; 125 TABLET, FILM COATED ORAL at 20:12

## 2025-02-18 RX ADMIN — ASPIRIN 81 MG CHEWABLE TABLET 81 MG: 81 TABLET CHEWABLE at 08:01

## 2025-02-18 RX ADMIN — AMOXICILLIN AND CLAVULANATE POTASSIUM 1 TABLET: 875; 125 TABLET, FILM COATED ORAL at 08:01

## 2025-02-18 RX ADMIN — LISINOPRIL 40 MG: 20 TABLET ORAL at 13:53

## 2025-02-18 RX ADMIN — SODIUM CHLORIDE, PRESERVATIVE FREE 10 ML: 5 INJECTION INTRAVENOUS at 10:27

## 2025-02-18 RX ADMIN — HEPARIN SODIUM 5000 UNITS: 5000 INJECTION INTRAVENOUS; SUBCUTANEOUS at 06:32

## 2025-02-18 RX ADMIN — PANTOPRAZOLE SODIUM 20 MG: 20 TABLET, DELAYED RELEASE ORAL at 16:08

## 2025-02-18 RX ADMIN — TRAZODONE HYDROCHLORIDE 50 MG: 50 TABLET ORAL at 20:12

## 2025-02-18 RX ADMIN — Medication 1.5 MG: at 20:12

## 2025-02-18 RX ADMIN — PANTOPRAZOLE SODIUM 20 MG: 20 TABLET, DELAYED RELEASE ORAL at 06:31

## 2025-02-18 NOTE — PLAN OF CARE
Problem: Safety - Adult  Goal: Free from fall injury  2/18/2025 1021 by Alena Velasco RN  Outcome: Progressing  2/18/2025 0337 by Afua Reno RN  Outcome: Progressing     Problem: Discharge Planning  Goal: Discharge to home or other facility with appropriate resources  2/18/2025 1021 by Alena Velasco RN  Outcome: Progressing  2/18/2025 0337 by Afua Reno RN  Outcome: Progressing     Problem: Pain  Goal: Verbalizes/displays adequate comfort level or baseline comfort level  2/18/2025 1021 by Alena Velasco RN  Outcome: Progressing  2/18/2025 0337 by Afua Reno RN  Outcome: Progressing     Problem: Skin/Tissue Integrity  Goal: Skin integrity remains intact  Description: 1.  Monitor for areas of redness and/or skin breakdown  2.  Assess vascular access sites hourly  3.  Every 4-6 hours minimum:  Change oxygen saturation probe site  4.  Every 4-6 hours:  If on nasal continuous positive airway pressure, respiratory therapy assess nares and determine need for appliance change or resting period  2/18/2025 1021 by Alena Velasco RN  Outcome: Progressing  2/18/2025 0337 by Afua Reno RN  Outcome: Progressing

## 2025-02-18 NOTE — CARE COORDINATION
Transition of Care Plan:    RUR: 9%  Prior Level of Functioning: Independent  Disposition: Home with [facility]  AVS updated.  ARCADIO: 2/19  If SNF or IPR: Date FOC offered:   Date FOC received:   Accepting facility:   Date authorization started with reference number:   Date authorization received and expires:   Follow up appointments: PCP/Specialist  DME needed: None  Transportation at discharge: Son  IM/IMM Medicare/ letter given: 2/15  Is patient a [place] and connected with [facility]?    If yes, was  transfer form completed and [facility] notified?   Caregiver Contact: Krystal Zafar [phone]  Discharge Caregiver contacted prior to discharge? Yes  Care Conference needed? No  Barriers to discharge:  Medical    Lizzie Lynch RN/CRM  [phone]     [id number]

## 2025-02-18 NOTE — PLAN OF CARE
Problem: Physical Therapy - Adult  Goal: By Discharge: Performs mobility at highest level of function for planned discharge setting.  See evaluation for individualized goals.  Description: FUNCTIONAL STATUS PRIOR TO ADMISSION: Pt is a poor historian. Reports he was independent with functional mobility prior to admission.    HOME SUPPORT PRIOR TO ADMISSION: Pt reports he lives with a significant other. D/t cognition, unable to state if she provides assistance for ADLs/IADLs.    Physical Therapy Goals  Initiated 2/15/2025  1.  Patient will move from supine to sit and sit to supine in bed with independence within 7 day(s).    2.  Patient will perform sit to stand with modified independence within 7 day(s).  3.  Patient will transfer from bed to chair and chair to bed with modified independence using the least restrictive device within 7 day(s).  4.  Patient will ambulate with supervision/set-up for 100 feet with the least restrictive device within 7 day(s).   5.  Patient will ascend/descend 5 stairs with  handrail(s) with supervision/set-up within 7 day(s).   Outcome: Progressing   PHYSICAL THERAPY TREATMENT    Patient: Benson Paez (84 y.o. male)  Date: 2/18/2025  Diagnosis: Lactic acidosis [E87.20]  Unresponsive episode [R40.4]  AGUSTINA (acute kidney injury) (HCC) [N17.9]  Severe sepsis (HCC) [A41.9, R65.20]  Hypotension, unspecified hypotension type [I95.9]  Abdominal pain with vomiting [R10.9, R11.10] Severe sepsis (HCC)      Precautions: Fall Risk                      ASSESSMENT:  Patient continues to benefit from skilled PT services and is progressing towards goals. Pt tolerated session well, but continues to be limited by generalized weakness, decreased functional mobility, impaired balance and gait, decreased safety awareness and increased risk for falls. Pt mobilized at CGA level for functional transfers and gait training with RW. Pt required cueing for RW management navigating obstacles and crowded hallway.  Pt will continue to benefit from PT to progress mobility as tolerated and reach highest level of independence.     PLAN:  Patient continues to benefit from skilled intervention to address the above impairments.  Continue treatment per established plan of care.        Recommendation for discharge: (in order for the patient to meet his/her long term goals):   Moderate intensity short-term skilled physical therapy up to 5x/week    Other factors to consider for discharge: impaired cognition, high risk for falls, not safe to be alone, and concern for safely navigating or managing the home environment    IF patient discharges home will need the following DME: continuing to assess with progress       SUBJECTIVE:   Patient stated, \"I guess I should use the bathroom before we go.\"    OBJECTIVE DATA SUMMARY:   Critical Behavior:  Orientation  Overall Orientation Status: Impaired  Orientation Level: Oriented to person;Oriented to place       Functional Mobility Training:  Bed Mobility:  Bed Mobility Training  Bed Mobility Training: Yes  Supine to Sit: Supervision or touching assistance  Transfers:  Transfer Training  Transfer Training: Yes  Sit to Stand: Contact guard assistance  Stand to Sit: Contact guard assistance  Balance:  Balance  Sitting: Intact  Standing: Impaired  Standing - Static: Good  Standing - Dynamic: Fair   Ambulation/Gait Training:     Gait  Gait Training: Yes  Overall Level of Assistance: Contact guard assistance  Distance (ft): 200 Feet  Assistive Device: Gait belt;Walker, rolling  Base of Support: Narrowed  Step Length: Left shortened;Right shortened  Gait Abnormalities: Decreased step clearance;Path deviations        Neuro Re-Education:                    Pain Ratin/10   Pain Intervention(s):       Activity Tolerance:   Good    After treatment:   Patient left in no apparent distress sitting up in chair, Call bell within reach, Bed/ chair alarm activated, and Side rails  x3      COMMUNICATION/EDUCATION:   The patient's plan of care was discussed with: occupational therapist and registered nurse    Patient Education  Education Given To: Patient  Education Provided: Role of Therapy;Plan of Care  Education Method: Verbal  Barriers to Learning: Cognition  Education Outcome: Continued education needed      Kaitlynn Wesley, PT, DPT  Minutes: 23

## 2025-02-18 NOTE — PROGRESS NOTES
[facility]                                                                                          Hospitalist Progress Note  Gaston Mcgill MD  Office Phone: [phone]        Date of Service:  2025  NAME:  Benson Paez  :  1940  MRN:  [id number]       Admission Summary:   84 y.o. male with hx of dementia, htn, gerd who presented to ed via ems for unconsciousness and hypoxia.  Per chart review, ems was activated to his home for AMS.  Who was reported found unconscious in his bathroom with evidence of recent emesis and noted to be hypoxic and hypotensive.       Interval history / Subjective:   Slept more at night  More comfortable/calm this AM       Assessment & Plan:     Acute respiratory failure with hypoxia  Suspected aspiration  Severe sepsis  -changed Zosyn to Augmentin  -off O2  -unclear what precipitated event, pt swallowing well, sounds like he got ill and vomited at home and likely aspirated as a result    Dementia with behavioral disturbances  -d/c tele  -trazodone and melatonin qhs - doing better with these    HTN:  -resume lisinopril    GERD         Code status: full  Prophylaxis: sc heparin  Care Plan discussed with: pt/family/rn   Anticipated Disposition: needs more supervision at home  Inpatient  Cardiac monitoring: Telemetry  Central Line:            Social Determinants of Health     Tobacco Use: Medium Risk (2021)    Received from [facility]  (prior to 2023), [facility]  (prior to 2023)    Patient History     Smoking Tobacco Use: Former     Smokeless Tobacco Use: Never     Passive Exposure: Not on file   Alcohol Use: Not on file   Financial Resource Strain: Not on file   Food Insecurity: Not on file   Transportation Needs: Not on file   Physical Activity: Not on file   Stress: Not on file   Social Connections: Not on file   Intimate Partner Violence: Not on file   Depression: Not at risk

## 2025-02-18 NOTE — PLAN OF CARE
Problem: Occupational Therapy - Adult  Goal: By Discharge: Performs self-care activities at highest level of function for planned discharge setting.  See evaluation for individualized goals.  Description: FUNCTIONAL STATUS PRIOR TO ADMISSION:  Patient with limited cognition (A&O x2), unreliable historian. Per his report, he lives with his significant other, IND with no AD use, no longer driving.     Occupational Therapy Goals:  Initiated 2/15/2025  1.  Patient will perform grooming at the sink with Minimal Assist within 7 day(s).  2.  Patient will perform bathing with Minimal Assist within 7 day(s).  3.  Patient will perform lower body dressing with Moderate Assist within 7 day(s).  4.  Patient will perform toilet transfers to/from bathroom with Minimal Assist within 7 day(s).  5.  Patient will perform all aspects of toileting with Moderate Assist within 7 day(s).  6.  Patient will participate in upper extremity therapeutic exercise/activities with Standby Assist for min minutes within 7 day(s).    7.  Patient will utilize energy conservation techniques during functional activities with verbal & visual cues within 7 day(s).    Outcome: Progressing    OCCUPATIONAL THERAPY TREATMENT  Patient: Benson Paez (84 y.o. male)  Date: 2/18/2025  Primary Diagnosis: Lactic acidosis [E87.20]  Unresponsive episode [R40.4]  AGUSTINA (acute kidney injury) (HCC) [N17.9]  Severe sepsis (HCC) [A41.9, R65.20]  Hypotension, unspecified hypotension type [I95.9]  Abdominal pain with vomiting [R10.9, R11.10]       Precautions: Fall Risk                Chart, occupational therapy assessment, plan of care, and goals were reviewed.    ASSESSMENT  Patient continues to benefit from skilled OT services and is progressing towards goals. Patient with improved balance in standing at sink and requires cues to use rolling walker, feel he may do better at home with hand held assist versus use of rolling walker. Discussed with  as patient's  family to assist at home and with plan to take patient home.          PLAN :  Patient continues to benefit from skilled intervention to address the above impairments.  Continue treatment per established plan of care to address goals.    Recommend with staff: Recommend patient be OOB to chair as frequently as tolerated;   Goal of 3x/day for all meals for 60 minutes at a time with CGA  For toileting needs, recommend transfer into bathroom with hand held assist versus rolling walker    Encourage patient involvement in personal care as able.      Recommend next OT session: per goal    Recommendation for discharge: (in order for the patient to meet his/her long term goals):   Intermittent occupational therapy up to 2-3x/week in previous living setting with additional support needed for ADL's and functional mobility    Other factors to consider for discharge: no additional factors    IF patient discharges home will need the following DME: continuing to assess with progress       SUBJECTIVE:   Patient stated “it don't make any sense to steal when you have the money to buy it.”    OBJECTIVE DATA SUMMARY:   Cognitive/Behavioral Status:  Orientation  Overall Orientation Status: Impaired  Orientation Level: Oriented to person;Oriented to place  Cognition  Overall Cognitive Status: Exceptions  Arousal/Alertness: Appears intact  Following Commands: Follows one step commands with repetition  Attention Span: Difficulty dividing attention;Impaired  Memory: Impaired  Safety Judgement: Impaired;Decreased awareness of need for safety  Problem Solving: Assistance required to correct errors made  Insights: Decreased awareness of deficits  Initiation: Requires cues for some  Sequencing: Requires cues for some    Functional Mobility and Transfers for ADLs:  Bed Mobility:  Bed Mobility Training  Bed Mobility Training: Yes  Overall Level of Assistance: Supervision or touching assistance  Supine to Sit: Supervision or touching assistance  Training  Education Method: Demonstration;Verbal  Barriers to Learning: Cognition  Education Outcome: Verbalized understanding;Continued education needed    Thank you for this referral.  Maira Monzon OTR/L  Minutes: 19

## 2025-02-18 NOTE — PLAN OF CARE
Problem: Physical Therapy - Adult  Goal: By Discharge: Performs mobility at highest level of function for planned discharge setting.  See evaluation for individualized goals.  Description: FUNCTIONAL STATUS PRIOR TO ADMISSION: Pt is a poor historian. Reports he was independent with functional mobility prior to admission.    HOME SUPPORT PRIOR TO ADMISSION: Pt reports he lives with a significant other. D/t cognition, unable to state if she provides assistance for ADLs/IADLs.    Physical Therapy Goals  Initiated 2/15/2025  1.  Patient will move from supine to sit and sit to supine in bed with independence within 7 day(s).    2.  Patient will perform sit to stand with modified independence within 7 day(s).  3.  Patient will transfer from bed to chair and chair to bed with modified independence using the least restrictive device within 7 day(s).  4.  Patient will ambulate with supervision/set-up for 100 feet with the least restrictive device within 7 day(s).   5.  Patient will ascend/descend 5 stairs with  handrail(s) with supervision/set-up within 7 day(s).   2/17/2025 1505 by Shiloh Young, PT  Outcome: Progressing     Problem: Occupational Therapy - Adult  Goal: By Discharge: Performs self-care activities at highest level of function for planned discharge setting.  See evaluation for individualized goals.  Description: FUNCTIONAL STATUS PRIOR TO ADMISSION:  Patient with limited cognition (A&O x2), unreliable historian. Per his report, he lives with his significant other, IND with no AD use, no longer driving.     Occupational Therapy Goals:  Initiated 2/15/2025  1.  Patient will perform grooming at the sink with Minimal Assist within 7 day(s).  2.  Patient will perform bathing with Minimal Assist within 7 day(s).  3.  Patient will perform lower body dressing with Moderate Assist within 7 day(s).  4.  Patient will perform toilet transfers to/from bathroom with Minimal Assist within 7 day(s).  5.  Patient will

## 2025-02-19 VITALS
BODY MASS INDEX: 23.13 KG/M2 | OXYGEN SATURATION: 97 % | RESPIRATION RATE: 16 BRPM | DIASTOLIC BLOOD PRESSURE: 68 MMHG | HEART RATE: 91 BPM | SYSTOLIC BLOOD PRESSURE: 129 MMHG | HEIGHT: 70 IN | WEIGHT: 161.6 LBS | TEMPERATURE: 97.5 F

## 2025-02-19 LAB
VAS LEFT CCA DIST EDV: 14.9 CM/S
VAS LEFT CCA DIST PSV: 82 CM/S
VAS LEFT CCA PROX EDV: 18.2 CM/S
VAS LEFT CCA PROX PSV: 90.8 CM/S
VAS LEFT ECA EDV: 6.2 CM/S
VAS LEFT ECA PSV: 94.1 CM/S
VAS LEFT ICA DIST EDV: 23.5 CM/S
VAS LEFT ICA DIST PSV: 79.9 CM/S
VAS LEFT ICA MID EDV: 26.4 CM/S
VAS LEFT ICA MID PSV: 98.9 CM/S
VAS LEFT ICA PROX EDV: 30.7 CM/S
VAS LEFT ICA PROX PSV: 107.4 CM/S
VAS LEFT ICA/CCA PSV: 1.3 NO UNITS
VAS LEFT VERTEBRAL EDV: 14.9 CM/S
VAS LEFT VERTEBRAL PSV: 59.8 CM/S
VAS RIGHT CCA DIST EDV: 15.1 CM/S
VAS RIGHT CCA DIST PSV: 94.6 CM/S
VAS RIGHT CCA PROX EDV: 12.5 CM/S
VAS RIGHT CCA PROX PSV: 98.6 CM/S
VAS RIGHT ECA EDV: 4.7 CM/S
VAS RIGHT ECA PSV: 79 CM/S
VAS RIGHT ICA DIST EDV: 23.1 CM/S
VAS RIGHT ICA DIST PSV: 98.3 CM/S
VAS RIGHT ICA MID EDV: 16.8 CM/S
VAS RIGHT ICA MID PSV: 68.1 CM/S
VAS RIGHT ICA PROX EDV: 14.9 CM/S
VAS RIGHT ICA PROX PSV: 76.5 CM/S
VAS RIGHT ICA/CCA PSV: 1 NO UNITS
VAS RIGHT VERTEBRAL EDV: 13.6 CM/S
VAS RIGHT VERTEBRAL PSV: 43.3 CM/S

## 2025-02-19 PROCEDURE — 6370000000 HC RX 637 (ALT 250 FOR IP): Performed by: STUDENT IN AN ORGANIZED HEALTH CARE EDUCATION/TRAINING PROGRAM

## 2025-02-19 PROCEDURE — 2500000003 HC RX 250 WO HCPCS: Performed by: STUDENT IN AN ORGANIZED HEALTH CARE EDUCATION/TRAINING PROGRAM

## 2025-02-19 PROCEDURE — 6370000000 HC RX 637 (ALT 250 FOR IP): Performed by: HOSPITALIST

## 2025-02-19 PROCEDURE — 6360000002 HC RX W HCPCS: Performed by: STUDENT IN AN ORGANIZED HEALTH CARE EDUCATION/TRAINING PROGRAM

## 2025-02-19 RX ADMIN — HEPARIN SODIUM 5000 UNITS: 5000 INJECTION INTRAVENOUS; SUBCUTANEOUS at 06:51

## 2025-02-19 RX ADMIN — LISINOPRIL 40 MG: 20 TABLET ORAL at 08:24

## 2025-02-19 RX ADMIN — SODIUM CHLORIDE, PRESERVATIVE FREE 10 ML: 5 INJECTION INTRAVENOUS at 08:24

## 2025-02-19 RX ADMIN — AMOXICILLIN AND CLAVULANATE POTASSIUM 1 TABLET: 875; 125 TABLET, FILM COATED ORAL at 08:24

## 2025-02-19 RX ADMIN — ASPIRIN 81 MG CHEWABLE TABLET 81 MG: 81 TABLET CHEWABLE at 08:24

## 2025-02-19 RX ADMIN — PANTOPRAZOLE SODIUM 20 MG: 20 TABLET, DELAYED RELEASE ORAL at 06:51

## 2025-02-19 NOTE — PLAN OF CARE
Problem: Safety - Adult  Goal: Free from fall injury  2/19/2025 1150 by Alena Velasco RN  Outcome: Adequate for Discharge  2/19/2025 1125 by Alena Velasco RN  Outcome: Adequate for Discharge     Problem: Discharge Planning  Goal: Discharge to home or other facility with appropriate resources  2/19/2025 1150 by Alena Velasco RN  Outcome: Adequate for Discharge  2/19/2025 1125 by Alena Velasco RN  Outcome: Adequate for Discharge     Problem: Pain  Goal: Verbalizes/displays adequate comfort level or baseline comfort level  2/19/2025 1150 by Alena Velasco RN  Outcome: Adequate for Discharge  2/19/2025 1125 by Alena Velasco RN  Outcome: Adequate for Discharge     Problem: Skin/Tissue Integrity  Goal: Skin integrity remains intact  Description: 1.  Monitor for areas of redness and/or skin breakdown  2.  Assess vascular access sites hourly  3.  Every 4-6 hours minimum:  Change oxygen saturation probe site  4.  Every 4-6 hours:  If on nasal continuous positive airway pressure, respiratory therapy assess nares and determine need for appliance change or resting period  2/19/2025 1150 by Alena Velasco RN  Outcome: Adequate for Discharge  2/19/2025 1125 by Alena Velasco RN  Outcome: Adequate for Discharge

## 2025-02-19 NOTE — DISCHARGE INSTRUCTIONS
Antibiotics for pneumonia completed in the hospital.  Follow-up with primary care provider in 1 week.

## 2025-02-19 NOTE — DISCHARGE SUMMARY
Discharge Summary       PATIENT ID: Benson Paez  MRN: [id number]   YOB: 1940    DATE OF ADMISSION: 2/15/2025  3:06 AM    DATE OF DISCHARGE: 2/19/2025   PRIMARY CARE PROVIDER: Sally Cabral MD     ATTENDING PHYSICIAN: Nano  DISCHARGING PROVIDER: Gaston Mcgill MD    To contact this individual call [phone] and ask the  to page.  If unavailable ask to be transferred the Adult Hospitalist Department.    CONSULTATIONS: IP CONSULT TO CASE MANAGEMENT  IP CONSULT TO CASE MANAGEMENT    PROCEDURES/SURGERIES: * No surgery found *     ADMITTING DIAGNOSES & HOSPITAL COURSE:   Acute respiratory failure w/ hypoxia  Suspected aspiration  Severe sepsis   Dementia  HTN  GERD    84 y.o. male with hx of dementia, htn, gerd who presented to ed via ems for unconsciousness and hypoxia.  Per chart review, ems was activated to his home for AMS.  He was reportedly found unconscious in his bathroom with evidence of recent emesis and noted to be hypoxic and hypotensive.    Acute respiratory failure with hypoxia  Suspected aspiration  Severe sepsis  -IV Zosyn -> Augmentin  -off O2  -unclear what precipitated event, pt swallowing well, sounds like he got ill and vomited at home and likely aspirated as a result     Dementia, delirium here  -did well with trazodone and melatonin qhs      HTN  GERD    DISCHARGE DIAGNOSES / PLAN:      FOLLOW UP APPOINTMENTS:    Follow-up Information       Follow up With Specialties Details Why Contact Info    Sally Cabral MD Family Medicine Schedule an appointment as soon as possible for a visit in 1 week(s)  [address]  [address]  [facility] [id number] [phone]                DISCHARGE MEDICATIONS:     Medication List        CONTINUE taking these medications      aspirin 81 MG chewable tablet     calcium carbonate 1250 (500 Ca) MG chewable tablet  Commonly known as: OS-PAMELA     lisinopril 40 MG tablet  Commonly known as: PRINIVIL;ZESTRIL     omeprazole 10 MG delayed

## 2025-02-19 NOTE — PLAN OF CARE
Problem: Physical Therapy - Adult  Goal: By Discharge: Performs mobility at highest level of function for planned discharge setting.  See evaluation for individualized goals.  Description: FUNCTIONAL STATUS PRIOR TO ADMISSION: Pt is a poor historian. Reports he was independent with functional mobility prior to admission.    HOME SUPPORT PRIOR TO ADMISSION: Pt reports he lives with a significant other. D/t cognition, unable to state if she provides assistance for ADLs/IADLs.    Physical Therapy Goals  Initiated 2/15/2025  1.  Patient will move from supine to sit and sit to supine in bed with independence within 7 day(s).    2.  Patient will perform sit to stand with modified independence within 7 day(s).  3.  Patient will transfer from bed to chair and chair to bed with modified independence using the least restrictive device within 7 day(s).  4.  Patient will ambulate with supervision/set-up for 100 feet with the least restrictive device within 7 day(s).   5.  Patient will ascend/descend 5 stairs with  handrail(s) with supervision/set-up within 7 day(s).   2/18/2025 1146 by Kaitlynn Wesley, PT  Outcome: Progressing

## 2025-02-19 NOTE — PLAN OF CARE
Problem: Safety - Adult  Goal: Free from fall injury  Outcome: Adequate for Discharge     Problem: Discharge Planning  Goal: Discharge to home or other facility with appropriate resources  Outcome: Adequate for Discharge     Problem: Pain  Goal: Verbalizes/displays adequate comfort level or baseline comfort level  Outcome: Adequate for Discharge     Problem: Skin/Tissue Integrity  Goal: Skin integrity remains intact  Description: 1.  Monitor for areas of redness and/or skin breakdown  2.  Assess vascular access sites hourly  3.  Every 4-6 hours minimum:  Change oxygen saturation probe site  4.  Every 4-6 hours:  If on nasal continuous positive airway pressure, respiratory therapy assess nares and determine need for appliance change or resting period  Outcome: Adequate for Discharge

## 2025-02-20 LAB
BACTERIA SPEC CULT: NORMAL
BACTERIA SPEC CULT: NORMAL
SERVICE CMNT-IMP: NORMAL
SERVICE CMNT-IMP: NORMAL

## 2025-05-28 ENCOUNTER — TELEPHONE (OUTPATIENT)
Facility: CLINIC | Age: 85
End: 2025-05-28

## 2025-05-28 NOTE — TELEPHONE ENCOUNTER
----- Message from Trey GARCIA sent at 5/28/2025 12:06 PM EDT -----  Regarding: [facility] Appointment Request  [facility] Appointment Request    Patient needs appointment for [facility] Appointment Type: New to Provider.    Patient Requested Dates(s):July 2025  Patient Requested Time:between 10-11 am  Provider Name:Priscilla Garcia MD    Reason for Appointment Request: New Patient - Requested Provider unavailable  --------------------------------------------------------------------------------------------------------------------------    Relationship to Patient: Other  son -Frank Paez    Call Back Information: OK to leave message on voicemail  Preferred Call Back Number: Phone  [phone]

## 2025-06-12 ENCOUNTER — OFFICE VISIT (OUTPATIENT)
Facility: CLINIC | Age: 85
End: 2025-06-12
Payer: MEDICARE

## 2025-06-12 VITALS
HEART RATE: 84 BPM | BODY MASS INDEX: 21.47 KG/M2 | HEIGHT: 70 IN | WEIGHT: 150 LBS | OXYGEN SATURATION: 97 % | DIASTOLIC BLOOD PRESSURE: 68 MMHG | TEMPERATURE: 97.5 F | RESPIRATION RATE: 16 BRPM | SYSTOLIC BLOOD PRESSURE: 100 MMHG

## 2025-06-12 DIAGNOSIS — B35.1 MYCOTIC TOENAILS: ICD-10-CM

## 2025-06-12 DIAGNOSIS — N40.1 BENIGN PROSTATIC HYPERPLASIA WITH URINARY FREQUENCY: ICD-10-CM

## 2025-06-12 DIAGNOSIS — F02.80 ALZHEIMER DISEASE (HCC): ICD-10-CM

## 2025-06-12 DIAGNOSIS — G51.32 HEMIFACIAL SPASM OF LEFT SIDE OF FACE: ICD-10-CM

## 2025-06-12 DIAGNOSIS — I65.23 BILATERAL CAROTID ARTERY STENOSIS: ICD-10-CM

## 2025-06-12 DIAGNOSIS — I10 PRIMARY HYPERTENSION: ICD-10-CM

## 2025-06-12 DIAGNOSIS — R35.0 BENIGN PROSTATIC HYPERPLASIA WITH URINARY FREQUENCY: ICD-10-CM

## 2025-06-12 DIAGNOSIS — Z76.89 ENCOUNTER TO ESTABLISH CARE: Primary | ICD-10-CM

## 2025-06-12 DIAGNOSIS — G30.9 ALZHEIMER DISEASE (HCC): ICD-10-CM

## 2025-06-12 DIAGNOSIS — I71.43 INFRARENAL ABDOMINAL AORTIC ANEURYSM (AAA) WITHOUT RUPTURE: ICD-10-CM

## 2025-06-12 PROBLEM — R65.20 SEVERE SEPSIS (HCC): Status: RESOLVED | Noted: 2025-02-15 | Resolved: 2025-06-12

## 2025-06-12 PROBLEM — A41.9 SEVERE SEPSIS (HCC): Status: RESOLVED | Noted: 2025-02-15 | Resolved: 2025-06-12

## 2025-06-12 PROCEDURE — 99204 OFFICE O/P NEW MOD 45 MIN: CPT

## 2025-06-12 PROCEDURE — G8427 DOCREV CUR MEDS BY ELIG CLIN: HCPCS

## 2025-06-12 PROCEDURE — 1123F ACP DISCUSS/DSCN MKR DOCD: CPT

## 2025-06-12 PROCEDURE — G8420 CALC BMI NORM PARAMETERS: HCPCS

## 2025-06-12 PROCEDURE — 1126F AMNT PAIN NOTED NONE PRSNT: CPT

## 2025-06-12 PROCEDURE — 1036F TOBACCO NON-USER: CPT

## 2025-06-12 PROCEDURE — 1159F MED LIST DOCD IN RCRD: CPT

## 2025-06-12 RX ORDER — DICYCLOMINE HCL 20 MG
TABLET ORAL
COMMUNITY
Start: 2025-04-19

## 2025-06-12 RX ORDER — AMLODIPINE BESYLATE 2.5 MG/1
2.5 TABLET ORAL
COMMUNITY
Start: 2025-05-14 | End: 2025-08-12

## 2025-06-12 RX ORDER — WHEAT DEXTRIN 3 G/3.8 G
4 POWDER (GRAM) ORAL
COMMUNITY

## 2025-06-12 RX ORDER — BLOOD PRESSURE TEST KIT
KIT MISCELLANEOUS
Qty: 1 KIT | Refills: 0 | Status: SHIPPED | OUTPATIENT
Start: 2025-06-12

## 2025-06-12 SDOH — ECONOMIC STABILITY: FOOD INSECURITY: WITHIN THE PAST 12 MONTHS, YOU WORRIED THAT YOUR FOOD WOULD RUN OUT BEFORE YOU GOT MONEY TO BUY MORE.: NEVER TRUE

## 2025-06-12 SDOH — ECONOMIC STABILITY: FOOD INSECURITY: WITHIN THE PAST 12 MONTHS, THE FOOD YOU BOUGHT JUST DIDN'T LAST AND YOU DIDN'T HAVE MONEY TO GET MORE.: NEVER TRUE

## 2025-06-12 ASSESSMENT — ENCOUNTER SYMPTOMS
SHORTNESS OF BREATH: 0
CHEST TIGHTNESS: 0
ABDOMINAL PAIN: 0

## 2025-06-12 ASSESSMENT — PATIENT HEALTH QUESTIONNAIRE - PHQ9: DEPRESSION UNABLE TO ASSESS: FUNCTIONAL CAPACITY MOTIVATION LIMITS ACCURACY

## 2025-06-12 NOTE — ASSESSMENT & PLAN NOTE
- Discussed with patient and his son that patient may benefit from recheck of electrolytes.  They were normal on hospital discharge a couple of months ago.  Discussed with him CT findings.  Patient's son verbalizes that he will continue to monitor this, possible neurology referral in the future.

## 2025-06-12 NOTE — PROGRESS NOTES
The patient, Benson Paez, identity was verified by name and MRN.  Chief Complaint   Patient presents with    Establish Care     Wanted someone new     No LMP for male patient.  Temp 97.5 °F (36.4 °C) (Temporal)   Wt 68 kg (150 lb)   BMI 21.52 kg/m²       6/12/2025     9:55 AM   Amb Fall Risk Assessment and TUG Test   Do you feel unsteady or are you worried about falling?  no   2 or more falls in past year? no   Fall with injury in past year? no     No data recorded  \"Have you been to the ER, urgent care clinic since your last visit?  Hospitalized since your last visit?\"    NO    “Have you seen or consulted any other health care providers outside our system since your last visit?”    NO           Social History     Substance and Sexual Activity   Sexual Activity Not Currently     Medication list reviewed and active medications noted. Patient is taking medications as directed.  See documentation in medication activity.  Allergies: allergy list reviewed, no new allergies added        No questionnaires available.                           Shantelle Engle LPN      
after-visit summary and questions were answered concerning future plans.  I have discussed medication side effects and warnings with the patient as well.    RONEL Lazcano - CNP

## 2025-06-12 NOTE — ASSESSMENT & PLAN NOTE
Chronic, at goal (stable), continue current treatment plan.  Patient send denies need for any additional care in the home.

## 2025-06-12 NOTE — ASSESSMENT & PLAN NOTE
- Patient's blood pressure is on the lower side.  Though he denies dizziness or lightheadedness he may not be forthcoming with this information because of history of dementia.  Patient is at a high risk for falls however he also has a history of intrarenal aortic aneurysm so tight blood pressure control is important.  Encourage patient limits his blood pressure 3-4 times a week and if he is consistently getting readings below 100/60 or above 135/90 to let the office know.

## 2025-08-08 RX ORDER — AMLODIPINE BESYLATE 2.5 MG/1
2.5 TABLET ORAL DAILY
Qty: 90 TABLET | Refills: 1 | Status: SHIPPED | OUTPATIENT
Start: 2025-08-08 | End: 2026-02-04

## 2025-08-08 RX ORDER — LISINOPRIL 40 MG/1
40 TABLET ORAL EVERY MORNING
Qty: 30 TABLET | Refills: 1 | Status: SHIPPED | OUTPATIENT
Start: 2025-08-08

## 2025-08-28 ENCOUNTER — OFFICE VISIT (OUTPATIENT)
Age: 85
End: 2025-08-28
Payer: MEDICARE

## 2025-08-28 VITALS
HEIGHT: 70 IN | BODY MASS INDEX: 21.19 KG/M2 | RESPIRATION RATE: 16 BRPM | WEIGHT: 148 LBS | OXYGEN SATURATION: 98 % | SYSTOLIC BLOOD PRESSURE: 120 MMHG | DIASTOLIC BLOOD PRESSURE: 80 MMHG | HEART RATE: 77 BPM

## 2025-08-28 DIAGNOSIS — H61.23 IMPACTED CERUMEN, BILATERAL: Primary | ICD-10-CM

## 2025-08-28 DIAGNOSIS — H91.93 BILATERAL HEARING LOSS, UNSPECIFIED HEARING LOSS TYPE: ICD-10-CM

## 2025-08-28 PROCEDURE — 69210 REMOVE IMPACTED EAR WAX UNI: CPT

## 2025-08-28 RX ORDER — AMMONIUM LACTATE 12 G/100G
CREAM TOPICAL
COMMUNITY
Start: 2025-08-27

## (undated) DEVICE — SUTURE VCRL SZ 0 L27IN ABSRB UD L26MM CT-2 1/2 CIR J270H

## (undated) DEVICE — SET ADMIN 16ML TBNG L100IN 2 Y INJ SITE IV PIGGY BK DISP

## (undated) DEVICE — ENDO CARRY-ON PROCEDURE KIT INCLUDES ENZYMATIC SPONGE, GAUZE, BIOHAZARD LABEL, TRAY, LUBRICANT, DIRTY SCOPE LABEL, WATER LABEL, TRAY, DRAWSTRING PAD, AND DEFENDO 4-PIECE KIT.: Brand: ENDO CARRY-ON PROCEDURE KIT

## (undated) DEVICE — GARMENT,MEDLINE,DVT,INT,CALF,MED, GEN2: Brand: MEDLINE

## (undated) DEVICE — KENDALL RADIOLUCENT FOAM MONITORING ELECTRODE -RECTANGULAR SHAPE: Brand: KENDALL

## (undated) DEVICE — SUTURE VCRL SZ 3-0 L27IN ABSRB UD L26MM SH 1/2 CIR J416H

## (undated) DEVICE — BW-412T DISP COMBO CLEANING BRUSH: Brand: SINGLE USE COMBINATION CLEANING BRUSH

## (undated) DEVICE — PACK,LAPAROTOMY,2 REINFORCED GOWNS: Brand: MEDLINE

## (undated) DEVICE — CATH IV AUTOGRD BC BLU 22GA 25 -- INSYTE

## (undated) DEVICE — REM POLYHESIVE ADULT PATIENT RETURN ELECTRODE: Brand: VALLEYLAB

## (undated) DEVICE — SOLIDIFIER FLUID 3000 CC ABSORB

## (undated) DEVICE — SET EXTN TBNG L BOR 4 W STPCOCK ST 32IN PRIMING VOL 6ML

## (undated) DEVICE — BAG SPEC BIOHZD LF 2MIL 6X10IN -- CONVERT TO ITEM 357326

## (undated) DEVICE — QUILTED PREMIUM COMFORT UNDERPAD,EXTRA HEAVY: Brand: WINGS

## (undated) DEVICE — AIRLIFE™ U/CONNECT-IT OXYGEN TUBING 7 FEET (2.1 M) CRUSH-RESISTANT OXYGEN TUBING, VINYL TIPPED: Brand: AIRLIFE™

## (undated) DEVICE — STERILE POLYISOPRENE POWDER-FREE SURGICAL GLOVES WITH EMOLLIENT COATING: Brand: PROTEXIS

## (undated) DEVICE — DRAPE,UTILTY,TAPE,15X26, 4EA/PK: Brand: MEDLINE

## (undated) DEVICE — PENCIL SMK EVAC 10 FT BLADE ELECTRD ROCKER FOR TELSCP

## (undated) DEVICE — HANDLE LT SNAP ON ULT DURABLE LENS FOR TRUMPF ALC DISPOSABLE

## (undated) DEVICE — SYR 10ML LUER LOK 1/5ML GRAD --

## (undated) DEVICE — NEEDLE HYPO 25GA L1.5IN BVL ORIENTED ECLIPSE

## (undated) DEVICE — SNARE ENDOSCP M L240CM W27MM SHTH DIA2.4MM CHN 2.8MM OVL

## (undated) DEVICE — TRAP SURG QUAD PARABOLA SLOT DSGN SFTY SCRN TRAPEASE

## (undated) DEVICE — FORCEPS BX L240CM JAW DIA2.8MM L CAP W/ NDL MIC MESH TOOTH

## (undated) DEVICE — BAG BELONG PT PERS CLEAR HANDL

## (undated) DEVICE — CONTAINER SPEC 20 ML LID NEUT BUFF FORMALIN 10 % POLYPR STS

## (undated) DEVICE — NEEDLE HYPO 18GA L1.5IN PNK S STL HUB POLYPR SHLD REG BVL

## (undated) DEVICE — SUTURE VCRL SZ 2-0 L27IN ABSRB UD L26MM SH 1/2 CIR J417H

## (undated) DEVICE — SURGICAL PROCEDURE PACK BASIN MAJ SET CUST NO CAUT

## (undated) DEVICE — INFECTION CONTROL KIT SYS

## (undated) DEVICE — SUTURE VCRL SZ 2-0 L27IN ABSRB UD L26MM CT-2 1/2 CIR J269H

## (undated) DEVICE — PREP SKN CHLRAPRP APL 26ML STR --

## (undated) DEVICE — 1200 GUARD II KIT W/5MM TUBE W/O VAC TUBE: Brand: GUARDIAN

## (undated) DEVICE — Z DISCONTINUED USE 2751540 TUBING IRRIG L10IN DISP PMP ENDOGATOR

## (undated) DEVICE — SYRINGE MED 20ML STD CLR PLAS LUERLOCK TIP N CTRL DISP

## (undated) DEVICE — STRAP,POSITIONING,KNEE/BODY,FOAM,4X60": Brand: MEDLINE

## (undated) DEVICE — NEEDLE HYPO 22GA L1.5IN BLK S STL HUB POLYPR SHLD REG BVL

## (undated) DEVICE — SOL IRR SOD CL 0.9% 1000ML BTL --

## (undated) DEVICE — NEEDLE SCLERO 23GA L4MM CATH L240CM CNTRST SHTH DIA1.8MM

## (undated) DEVICE — KIT IV STRT W CHLORAPREP PD 1ML

## (undated) DEVICE — SUTURE MCRYL SZ 4-0 L27IN ABSRB UD L19MM PS-2 1/2 CIR PRIM Y426H

## (undated) DEVICE — CONNECTOR TBNG AUX H2O JET DISP FOR OLY 160/180 SER

## (undated) DEVICE — DERMABOND SKIN ADH 0.7ML -- DERMABOND ADVANCED 12/BX